# Patient Record
Sex: MALE | Race: OTHER | HISPANIC OR LATINO | URBAN - METROPOLITAN AREA
[De-identification: names, ages, dates, MRNs, and addresses within clinical notes are randomized per-mention and may not be internally consistent; named-entity substitution may affect disease eponyms.]

---

## 2019-12-23 ENCOUNTER — INPATIENT (INPATIENT)
Facility: HOSPITAL | Age: 84
LOS: 3 days | Discharge: HOME HEALTH | End: 2019-12-27
Attending: INTERNAL MEDICINE | Admitting: INTERNAL MEDICINE
Payer: MEDICAID

## 2019-12-23 VITALS
TEMPERATURE: 99 F | OXYGEN SATURATION: 99 % | SYSTOLIC BLOOD PRESSURE: 124 MMHG | HEART RATE: 87 BPM | RESPIRATION RATE: 22 BRPM | DIASTOLIC BLOOD PRESSURE: 77 MMHG

## 2019-12-23 DIAGNOSIS — Z02.9 ENCOUNTER FOR ADMINISTRATIVE EXAMINATIONS, UNSPECIFIED: ICD-10-CM

## 2019-12-23 DIAGNOSIS — R74.0 NONSPECIFIC ELEVATION OF LEVELS OF TRANSAMINASE AND LACTIC ACID DEHYDROGENASE [LDH]: ICD-10-CM

## 2019-12-23 DIAGNOSIS — A41.9 SEPSIS, UNSPECIFIED ORGANISM: ICD-10-CM

## 2019-12-23 DIAGNOSIS — E87.1 HYPO-OSMOLALITY AND HYPONATREMIA: ICD-10-CM

## 2019-12-23 DIAGNOSIS — L02.91 CUTANEOUS ABSCESS, UNSPECIFIED: ICD-10-CM

## 2019-12-23 DIAGNOSIS — Z29.9 ENCOUNTER FOR PROPHYLACTIC MEASURES, UNSPECIFIED: ICD-10-CM

## 2019-12-23 DIAGNOSIS — D64.9 ANEMIA, UNSPECIFIED: ICD-10-CM

## 2019-12-23 DIAGNOSIS — E87.8 OTHER DISORDERS OF ELECTROLYTE AND FLUID BALANCE, NOT ELSEWHERE CLASSIFIED: ICD-10-CM

## 2019-12-23 DIAGNOSIS — R50.9 FEVER, UNSPECIFIED: ICD-10-CM

## 2019-12-23 LAB
ALBUMIN SERPL ELPH-MCNC: 3.6 G/DL — SIGNIFICANT CHANGE UP (ref 3.3–5)
ALP SERPL-CCNC: 127 U/L — HIGH (ref 40–120)
ALT FLD-CCNC: 28 U/L — SIGNIFICANT CHANGE UP (ref 4–41)
ANION GAP SERPL CALC-SCNC: 10 MMO/L — SIGNIFICANT CHANGE UP (ref 7–14)
AST SERPL-CCNC: 51 U/L — HIGH (ref 4–40)
BASE EXCESS BLDV CALC-SCNC: 0.4 MMOL/L — SIGNIFICANT CHANGE UP
BASOPHILS # BLD AUTO: 0.02 K/UL — SIGNIFICANT CHANGE UP (ref 0–0.2)
BASOPHILS NFR BLD AUTO: 0.2 % — SIGNIFICANT CHANGE UP (ref 0–2)
BILIRUB SERPL-MCNC: 2.6 MG/DL — HIGH (ref 0.2–1.2)
BLOOD GAS VENOUS - CREATININE: 0.75 MG/DL — SIGNIFICANT CHANGE UP (ref 0.5–1.3)
BLOOD GAS VENOUS - FIO2: 21 — SIGNIFICANT CHANGE UP
BUN SERPL-MCNC: 15 MG/DL — SIGNIFICANT CHANGE UP (ref 7–23)
CALCIUM SERPL-MCNC: 8.5 MG/DL — SIGNIFICANT CHANGE UP (ref 8.4–10.5)
CHLORIDE BLDV-SCNC: 95 MMOL/L — LOW (ref 96–108)
CHLORIDE SERPL-SCNC: 93 MMOL/L — LOW (ref 98–107)
CO2 SERPL-SCNC: 22 MMOL/L — SIGNIFICANT CHANGE UP (ref 22–31)
CREAT SERPL-MCNC: 0.77 MG/DL — SIGNIFICANT CHANGE UP (ref 0.5–1.3)
EOSINOPHIL # BLD AUTO: 0.28 K/UL — SIGNIFICANT CHANGE UP (ref 0–0.5)
EOSINOPHIL NFR BLD AUTO: 2.4 % — SIGNIFICANT CHANGE UP (ref 0–6)
GAS PNL BLDV: 126 MMOL/L — LOW (ref 136–146)
GLUCOSE BLDV-MCNC: 133 MG/DL — HIGH (ref 70–99)
GLUCOSE SERPL-MCNC: 134 MG/DL — HIGH (ref 70–99)
HBA1C BLD-MCNC: 5.3 % — SIGNIFICANT CHANGE UP (ref 4–5.6)
HCO3 BLDV-SCNC: 24 MMOL/L — SIGNIFICANT CHANGE UP (ref 20–27)
HCT VFR BLD CALC: 36.8 % — LOW (ref 39–50)
HCT VFR BLDV CALC: 38.7 % — LOW (ref 39–51)
HGB BLD-MCNC: 12.3 G/DL — LOW (ref 13–17)
HGB BLDV-MCNC: 12.6 G/DL — LOW (ref 13–17)
IMM GRANULOCYTES NFR BLD AUTO: 0.4 % — SIGNIFICANT CHANGE UP (ref 0–1.5)
LACTATE BLDV-MCNC: 2.3 MMOL/L — HIGH (ref 0.5–2)
LYMPHOCYTES # BLD AUTO: 0.48 K/UL — LOW (ref 1–3.3)
LYMPHOCYTES # BLD AUTO: 4.1 % — LOW (ref 13–44)
MAGNESIUM SERPL-MCNC: 1.9 MG/DL — SIGNIFICANT CHANGE UP (ref 1.6–2.6)
MCHC RBC-ENTMCNC: 32.1 PG — SIGNIFICANT CHANGE UP (ref 27–34)
MCHC RBC-ENTMCNC: 33.4 % — SIGNIFICANT CHANGE UP (ref 32–36)
MCV RBC AUTO: 96.1 FL — SIGNIFICANT CHANGE UP (ref 80–100)
MONOCYTES # BLD AUTO: 0.81 K/UL — SIGNIFICANT CHANGE UP (ref 0–0.9)
MONOCYTES NFR BLD AUTO: 6.9 % — SIGNIFICANT CHANGE UP (ref 2–14)
NEUTROPHILS # BLD AUTO: 10.06 K/UL — HIGH (ref 1.8–7.4)
NEUTROPHILS NFR BLD AUTO: 86 % — HIGH (ref 43–77)
NRBC # FLD: 0 K/UL — SIGNIFICANT CHANGE UP (ref 0–0)
PCO2 BLDV: 41 MMHG — SIGNIFICANT CHANGE UP (ref 41–51)
PH BLDV: 7.4 PH — SIGNIFICANT CHANGE UP (ref 7.32–7.43)
PHOSPHATE SERPL-MCNC: 1.7 MG/DL — LOW (ref 2.5–4.5)
PLATELET # BLD AUTO: 158 K/UL — SIGNIFICANT CHANGE UP (ref 150–400)
PMV BLD: 10.1 FL — SIGNIFICANT CHANGE UP (ref 7–13)
PO2 BLDV: 40 MMHG — SIGNIFICANT CHANGE UP (ref 35–40)
POTASSIUM BLDV-SCNC: 4 MMOL/L — SIGNIFICANT CHANGE UP (ref 3.4–4.5)
POTASSIUM SERPL-MCNC: 4.3 MMOL/L — SIGNIFICANT CHANGE UP (ref 3.5–5.3)
POTASSIUM SERPL-SCNC: 4.3 MMOL/L — SIGNIFICANT CHANGE UP (ref 3.5–5.3)
PROT SERPL-MCNC: 7.1 G/DL — SIGNIFICANT CHANGE UP (ref 6–8.3)
RBC # BLD: 3.83 M/UL — LOW (ref 4.2–5.8)
RBC # FLD: 13.1 % — SIGNIFICANT CHANGE UP (ref 10.3–14.5)
SAO2 % BLDV: 74.7 % — SIGNIFICANT CHANGE UP (ref 60–85)
SODIUM SERPL-SCNC: 125 MMOL/L — LOW (ref 135–145)
WBC # BLD: 11.7 K/UL — HIGH (ref 3.8–10.5)
WBC # FLD AUTO: 11.7 K/UL — HIGH (ref 3.8–10.5)

## 2019-12-23 PROCEDURE — 99223 1ST HOSP IP/OBS HIGH 75: CPT | Mod: GC

## 2019-12-23 RX ORDER — SODIUM CHLORIDE 9 MG/ML
500 INJECTION INTRAMUSCULAR; INTRAVENOUS; SUBCUTANEOUS ONCE
Refills: 0 | Status: COMPLETED | OUTPATIENT
Start: 2019-12-23 | End: 2019-12-23

## 2019-12-23 RX ORDER — AMPICILLIN SODIUM AND SULBACTAM SODIUM 250; 125 MG/ML; MG/ML
1.5 INJECTION, POWDER, FOR SUSPENSION INTRAMUSCULAR; INTRAVENOUS EVERY 6 HOURS
Refills: 0 | Status: DISCONTINUED | OUTPATIENT
Start: 2019-12-23 | End: 2019-12-27

## 2019-12-23 RX ORDER — ENOXAPARIN SODIUM 100 MG/ML
40 INJECTION SUBCUTANEOUS DAILY
Refills: 0 | Status: DISCONTINUED | OUTPATIENT
Start: 2019-12-23 | End: 2019-12-27

## 2019-12-23 RX ORDER — ACETAMINOPHEN 500 MG
650 TABLET ORAL EVERY 6 HOURS
Refills: 0 | Status: DISCONTINUED | OUTPATIENT
Start: 2019-12-23 | End: 2019-12-27

## 2019-12-23 RX ORDER — VANCOMYCIN HCL 1 G
VIAL (EA) INTRAVENOUS
Refills: 0 | Status: DISCONTINUED | OUTPATIENT
Start: 2019-12-24 | End: 2019-12-25

## 2019-12-23 RX ORDER — POTASSIUM PHOSPHATE, MONOBASIC POTASSIUM PHOSPHATE, DIBASIC 236; 224 MG/ML; MG/ML
15 INJECTION, SOLUTION INTRAVENOUS ONCE
Refills: 0 | Status: COMPLETED | OUTPATIENT
Start: 2019-12-23 | End: 2019-12-23

## 2019-12-23 RX ORDER — SODIUM,POTASSIUM PHOSPHATES 278-250MG
1 POWDER IN PACKET (EA) ORAL
Refills: 0 | Status: COMPLETED | OUTPATIENT
Start: 2019-12-23 | End: 2019-12-24

## 2019-12-23 RX ADMIN — Medication 1 PACKET(S): at 22:02

## 2019-12-23 RX ADMIN — SODIUM CHLORIDE 500 MILLILITER(S): 9 INJECTION INTRAMUSCULAR; INTRAVENOUS; SUBCUTANEOUS at 20:39

## 2019-12-23 RX ADMIN — POTASSIUM PHOSPHATE, MONOBASIC POTASSIUM PHOSPHATE, DIBASIC 62.5 MILLIMOLE(S): 236; 224 INJECTION, SOLUTION INTRAVENOUS at 22:02

## 2019-12-23 RX ADMIN — Medication 1 TABLET(S): at 17:59

## 2019-12-23 NOTE — H&P ADULT - NSHPLABSRESULTS_GEN_ALL_CORE
CBC Full  -  ( 23 Dec 2019 11:00 )  WBC Count : 11.70 K/uL  Hemoglobin : 12.3 g/dL  Hematocrit : 36.8 %  Platelet Count - Automated : 158 K/uL  Mean Cell Volume : 96.1 fL  Mean Cell Hemoglobin : 32.1 pg  Mean Cell Hemoglobin Concentration : 33.4 %  Auto Neutrophil # : 10.06 K/uL  Auto Lymphocyte # : 0.48 K/uL  Auto Monocyte # : 0.81 K/uL  Auto Eosinophil # : 0.28 K/uL  Auto Basophil # : 0.02 K/uL  Auto Neutrophil % : 86.0 %  Auto Lymphocyte % : 4.1 %  Auto Monocyte % : 6.9 %  Auto Eosinophil % : 2.4 %  Auto Basophil % : 0.2 %    12-23    125<L>  |  93<L>  |  15  ----------------------------<  134<H>  4.3   |  22  |  0.77    Ca    8.5      23 Dec 2019 15:34  Phos  1.7     12-23  Mg     1.9     12-23    TPro  7.1  /  Alb  3.6  /  TBili  2.6<H>  /  DBili  x   /  AST  51<H>  /  ALT  28  /  AlkPhos  127<H>  12-23    LIVER FUNCTIONS - ( 23 Dec 2019 15:34 )  Alb: 3.6 g/dL / Pro: 7.1 g/dL / ALK PHOS: 127 u/L / ALT: 28 u/L / AST: 51 u/L / GGT: x

## 2019-12-23 NOTE — ED PROVIDER NOTE - PROGRESS NOTE DETAILS
CIANO- abscess drained at bedside by PA but on labs found to be hyponatremic. will require admission

## 2019-12-23 NOTE — H&P ADULT - PROBLEM SELECTOR PLAN 4
- patient with elevated T.bili, mild transaminitis  - possibly 2/2 to sepsis  - obtain abdominal ultrasound  - continue to trend - patient with elevated T.bili, mild transaminitis improved s/p fluids  - possibly 2/2 to sepsis  - continue to trend

## 2019-12-23 NOTE — H&P ADULT - ATTENDING COMMENTS
Patient seen and examined at bedside. Neck abscess s/p I&D per ED. BCx sent. Will c/w abx. Will perform CT neck w/ contrast. Monitor CBC. Also presented with hyponatremia s/p IV bolus. Will repeat BMP at midnight and BMP q6h. Urine lytes sent to determine etiology.

## 2019-12-23 NOTE — ED PROCEDURE NOTE - ATTENDING CONTRIBUTION TO CARE
elderly male with neck abscess drained at bedside by PA elderly male with left lateral neck abscess with superficial depth drained at bedside by PA

## 2019-12-23 NOTE — ED ADULT NURSE NOTE - OBJECTIVE STATEMENT
89 y/o M received to room 23 c/o abscess. Pt a&ox2 and ambulatory (not oriented to date) at baseline. Pt is [place]. Pt primarily Citizen of [place] speaking and requested in english for family members to translate. Pt unsure how long he has had the abscess located on L posterior side of neck. Pt states he has no pain. Abscess warm to touch and nontender with scab observed.  Pt has full ROM of neck and no pain with movement.  Pt respirations even and unlabored with lung sounds clear bilaterally. Pt abdomen soft non-tender nondistended. Pt denies fevers chills, CP, SOB, HA, dizziness, lightheadedness or n/v/d. Vital signs as noted, call bell in reach, comfort measures provided, md evaluated, 20G IV placed L AC, labs drawn and sent.  Will continue to monitor.

## 2019-12-23 NOTE — ED PROVIDER NOTE - MUSCULOSKELETAL, MLM
No visible deformities, edema, erythema,  range of motion is not limited, no muscle or joint tenderness

## 2019-12-23 NOTE — H&P ADULT - NSHPREVIEWOFSYSTEMS_GEN_ALL_CORE
REVIEW OF SYSTEMS:    CONSTITUTIONAL: No weakness, fevers or chills  EYES/ENT: No visual changes;  No vertigo or throat pain   NECK: see HPI  RESPIRATORY: No cough, wheezing, hemoptysis; No shortness of breath  CARDIOVASCULAR: No chest pain or palpitations  GASTROINTESTINAL: No abdominal pain; nausea, vomiting;  diarrhea or constipation. No hemetemesis, melena or hematochezia.  GENITOURINARY: No dysuria, frequency or hematuria  NEUROLOGICAL: No numbness or weakness  SKIN: No itching, burning, rashes, or lesions   MUSCULOSKEL: no pain  All other review of systems is negative unless indicated above.

## 2019-12-23 NOTE — H&P ADULT - ASSESSMENT
89 M with no significant past medical history presents with abscess on the L side of his neck, s/p drainage, now on abx. Patient also found to be hyponatremia.

## 2019-12-23 NOTE — ED ADULT NURSE NOTE - NSIMPLEMENTINTERV_GEN_ALL_ED
Implemented All Fall with Harm Risk Interventions:  [place] to call system. Call bell, personal items and telephone within reach. Instruct patient to call for assistance. Room bathroom lighting operational. Non-slip footwear when patient is off stretcher. Physically safe environment: no spills, clutter or unnecessary equipment. Stretcher in lowest position, wheels locked, appropriate side rails in place. Provide visual cue, wrist band, yellow gown, etc. Monitor gait and stability. Monitor for mental status changes and reorient to person, place, and time. Review medications for side effects contributing to fall risk. Reinforce activity limits and safety measures with patient and family. Provide visual clues: red socks.

## 2019-12-23 NOTE — H&P ADULT - PROBLEM SELECTOR PLAN 2
- patient septic on admission (febrile, tachypneic 2/2 to neck abscess)  - f/u cultures of neck abscess drainage  - f/u blood cultures  - elevated lactate 2.3, will repeat s/p fluids

## 2019-12-23 NOTE — H&P ADULT - HISTORY OF PRESENT ILLNESS
89 M with no significant past medical history presents with abscess on the L side of his neck. Per son, he thinks neck mass has been there for about 3 days only. He had a small pimple on his neck that he popped and since then has been having some pain and discharge. No difficulty moving his neck, no difficulty swallowing. Patient denies any trauma, bug bites, fevers, chills, rashes. No nausea, vomiting, chest pain, shortness of breath. No dysuria, hematuria, or diarrhea. Patient denies any runny nose, sore throat cough.     In the ED, T 100.7, HR 87, /77, RR 22 satting 99% room air. Patient had I&D of neck mass and received Bactrim. 89 M with no significant past medical history presents with abscess on the L side of his neck. Per son, he thinks neck mass has been there for about 3 days only. He had a small pimple on his neck that he popped and since then has been having some pain and discharge. Neck is warm and erythematous. No difficulty moving his neck, no difficulty swallowing. Patient denies any trauma, bug bites, fevers, chills, rashes. No nausea, vomiting, chest pain, shortness of breath. No dysuria, hematuria, or diarrhea. Patient denies any runny nose, sore throat cough.     In the ED, T 100.7, HR 87, /77, RR 22 satting 99% room air. Patient had I&D of neck mass and received Bactrim.

## 2019-12-23 NOTE — ED PROVIDER NOTE - ATTENDING CONTRIBUTION TO CARE
ashley: limited hx from pt and family at bedside.  visiting from [place] since last week. today family noted swelling to left neck and came to ED. pt offers not complaints. Denies any PMH.  exam: there is a large abscess posterior left neck with pus expressable.   exam otherwise unremarkable.  recc: labs incl hba1c. rectal temp. Drain asbcess; sent for culture and start antibitoics. Disposition dependent on labs and findings after I and D. ashley: limited hx from pt and family at bedside.  visiting from [place] since last week. today family noted swelling to left neck and came to ED. pt offers not complaints. Denies any PMH.  exam: there is a large abscess posterior left neck with pus expressable.   exam otherwise unremarkable.  recc: labs incl hba1c. rectal temp. Drain asbcess; sent for culture and start antibitoics. Disposition dependent on labs and findings after I and D.. ashley: limited hx from pt and family at bedside.  visiting from [place] since last week. today family noted swelling to left neck and came to ED. pt offers not complaints. Denies any PMH.  exam: there is a large abscess posterior left neck with pus expressible.   exam otherwise unremarkable.  recc: labs incl hba1c. rectal temp. Drain abscess; sent for culture and start antibiotics. Disposition dependent on labs and findings after I and D..

## 2019-12-23 NOTE — H&P ADULT - NSHPPHYSICALEXAM_GEN_ALL_CORE
PHYSICAL EXAM:    Vital Signs Last 24 Hrs  T(C): 38.2 (23 Dec 2019 15:53), Max: 38.2 (23 Dec 2019 15:53)  T(F): 100.7 (23 Dec 2019 15:53), Max: 100.7 (23 Dec 2019 15:53)  HR: 71 (23 Dec 2019 15:53) (71 - 87)  BP: 109/64 (23 Dec 2019 15:53) (109/64 - 124/77)  BP(mean): --  RR: 16 (23 Dec 2019 15:53) (16 - 22)  SpO2: 99% (23 Dec 2019 15:53) (99% - 99%)    General: No acute distress.  HEENT: NCAT.  PERRL.  EOMI.  No scleral icterus or injection.  Moist MM.  No oropharyngeal exudates. Poor dentition  Neck: Supple.  Full ROM.  No JVD.  + neck abscess on L posterior neck with small incision at the center, tender to palpation  Heart: Irregular rate. Normal S1 and S2.  No murmurs, rubs, or gallops.   Lungs: CTAB. No wheezes, crackles, or rhonchi.    Abdomen: BS+, soft, NT/ND.   Skin: induration of L posterior neck with pus draining as well as blood   Extremities: No edema, clubbing, or cyanosis.    Musculoskeletal: No deformities.  No spinal or paraspinal tenderness.  Neuro: A&Ox3.  Moving all extremities, no neuro focal deficits

## 2019-12-23 NOTE — ED PROVIDER NOTE - SKIN COLOR
normal for race/area of fluctuance and erythema with purulent drainage the size of a quarter on the R posterior neck. normal for race/area of fluctuance and erythema with purulent drainage the size of a quarter on the L lateral neck.

## 2019-12-23 NOTE — H&P ADULT - PROBLEM SELECTOR PLAN 3
- patient hyponatremic to 125 on admission  - f/u serum osmolality, urine lytes  - give 500 cc NS bolus  - repeat BMP  - continue to trend - patient hyponatremic to 125 on admission, s/p 500cc bolus with no improvement in sodium level  - low serum osmolality, urine lytes consistent with SIADH  - f/u TSH, am cortisol  - repeat BMP  - continue to trend

## 2019-12-23 NOTE — ED ADULT TRIAGE NOTE - CHIEF COMPLAINT QUOTE
pt. brought by granddaughter for an abscess on the L side of his neck, redness noted around the abscess, unsure of how long it has been there. Family denies PMHx.

## 2019-12-23 NOTE — ED PROVIDER NOTE - OBJECTIVE STATEMENT
89yo male with no sig pmh, as per granddaughter, presents complaining of an abscess on the right side of his neck. Patient is poor historian unable to answer questions appropriately. Granddaughter at bedside reports she does not know how long he has had the abscess because she just saw him for the first time today since he arrived from [place] 2 months ago. She says that patient told her that he felt a small pimple on his neck that he popped 3 days ago and reports some pain but denies antecedent trauma to the area, bug bites, fevers/chills, rashes, pain with rom of neck, nausea/vomiting, chest pain, sob, and other associated symptoms. 89yo male with no sig pmh, as per granddaughter, presents complaining of an abscess on the left side of his neck. Patient is poor historian unable to answer questions appropriately. Granddaughter at bedside reports she does not know how long he has had the abscess because she just saw him for the first time today since he arrived from [place] 2 months ago. She says that patient told her that he felt a small pimple on his neck that he popped 3 days ago and reports some pain but denies antecedent trauma to the area, bug bites, fevers/chills, rashes, pain with rom of neck, nausea/vomiting, chest pain, sob, and other associated symptoms.

## 2019-12-23 NOTE — H&P ADULT - NSHPSOCIALHISTORY_GEN_ALL_CORE
Patient lives with daughter in [place], came to visit son in [place]. No smoking history, occasional alcohol history. Patient is ambulatory at baseline with no assistance.

## 2019-12-23 NOTE — H&P ADULT - PROBLEM SELECTOR PLAN 1
- patient with L sided neck abscess s/p drainage  - f/u cultures  - continue Bactrim   - If no improvement, can consider imaging of neck mass - patient with L sided neck abscess s/p drainage  - f/u cultures of wound, continue Bactrim for now  - f/u CT neck to assess extent of abscess - patient with L sided neck abscess s/p drainage, s/p Bactrim  - f/u cultures of wound  - f/u CT neck to assess extent of abscess  - continue abx, broaden to vanc, unasyn

## 2019-12-24 LAB
ALBUMIN SERPL ELPH-MCNC: 3 G/DL — LOW (ref 3.3–5)
ALBUMIN SERPL ELPH-MCNC: 3.2 G/DL — LOW (ref 3.3–5)
ALP SERPL-CCNC: 109 U/L — SIGNIFICANT CHANGE UP (ref 40–120)
ALP SERPL-CCNC: 110 U/L — SIGNIFICANT CHANGE UP (ref 40–120)
ALT FLD-CCNC: 24 U/L — SIGNIFICANT CHANGE UP (ref 4–41)
ALT FLD-CCNC: 24 U/L — SIGNIFICANT CHANGE UP (ref 4–41)
ANION GAP SERPL CALC-SCNC: 10 MMO/L — SIGNIFICANT CHANGE UP (ref 7–14)
ANION GAP SERPL CALC-SCNC: 12 MMO/L — SIGNIFICANT CHANGE UP (ref 7–14)
AST SERPL-CCNC: 33 U/L — SIGNIFICANT CHANGE UP (ref 4–40)
AST SERPL-CCNC: 33 U/L — SIGNIFICANT CHANGE UP (ref 4–40)
BASOPHILS # BLD AUTO: 0.03 K/UL — SIGNIFICANT CHANGE UP (ref 0–0.2)
BASOPHILS NFR BLD AUTO: 0.3 % — SIGNIFICANT CHANGE UP (ref 0–2)
BILIRUB SERPL-MCNC: 2.1 MG/DL — HIGH (ref 0.2–1.2)
BILIRUB SERPL-MCNC: 2.5 MG/DL — HIGH (ref 0.2–1.2)
BUN SERPL-MCNC: 11 MG/DL — SIGNIFICANT CHANGE UP (ref 7–23)
BUN SERPL-MCNC: 12 MG/DL — SIGNIFICANT CHANGE UP (ref 7–23)
CALCIUM SERPL-MCNC: 7.9 MG/DL — LOW (ref 8.4–10.5)
CALCIUM SERPL-MCNC: 8.2 MG/DL — LOW (ref 8.4–10.5)
CHLORIDE SERPL-SCNC: 93 MMOL/L — LOW (ref 98–107)
CHLORIDE SERPL-SCNC: 95 MMOL/L — LOW (ref 98–107)
CHLORIDE UR-SCNC: 84 MMOL/L — SIGNIFICANT CHANGE UP
CO2 SERPL-SCNC: 20 MMOL/L — LOW (ref 22–31)
CO2 SERPL-SCNC: 21 MMOL/L — LOW (ref 22–31)
CREAT SERPL-MCNC: 0.7 MG/DL — SIGNIFICANT CHANGE UP (ref 0.5–1.3)
CREAT SERPL-MCNC: 0.71 MG/DL — SIGNIFICANT CHANGE UP (ref 0.5–1.3)
EOSINOPHIL # BLD AUTO: 0.49 K/UL — SIGNIFICANT CHANGE UP (ref 0–0.5)
EOSINOPHIL NFR BLD AUTO: 4.5 % — SIGNIFICANT CHANGE UP (ref 0–6)
FERRITIN SERPL-MCNC: 387.4 NG/ML — SIGNIFICANT CHANGE UP (ref 30–400)
GLUCOSE SERPL-MCNC: 108 MG/DL — HIGH (ref 70–99)
GLUCOSE SERPL-MCNC: 125 MG/DL — HIGH (ref 70–99)
GRAM STN SPEC: SIGNIFICANT CHANGE UP
HCT VFR BLD CALC: 35 % — LOW (ref 39–50)
HGB BLD-MCNC: 11.7 G/DL — LOW (ref 13–17)
IMM GRANULOCYTES NFR BLD AUTO: 0.5 % — SIGNIFICANT CHANGE UP (ref 0–1.5)
IRON SATN MFR SERPL: 17 UG/DL — LOW (ref 45–165)
IRON SATN MFR SERPL: 178 UG/DL — SIGNIFICANT CHANGE UP (ref 155–535)
LYMPHOCYTES # BLD AUTO: 0.51 K/UL — LOW (ref 1–3.3)
LYMPHOCYTES # BLD AUTO: 4.6 % — LOW (ref 13–44)
MAGNESIUM SERPL-MCNC: 1.8 MG/DL — SIGNIFICANT CHANGE UP (ref 1.6–2.6)
MAGNESIUM SERPL-MCNC: 1.8 MG/DL — SIGNIFICANT CHANGE UP (ref 1.6–2.6)
MCHC RBC-ENTMCNC: 31.9 PG — SIGNIFICANT CHANGE UP (ref 27–34)
MCHC RBC-ENTMCNC: 33.4 % — SIGNIFICANT CHANGE UP (ref 32–36)
MCV RBC AUTO: 95.4 FL — SIGNIFICANT CHANGE UP (ref 80–100)
MONOCYTES # BLD AUTO: 0.81 K/UL — SIGNIFICANT CHANGE UP (ref 0–0.9)
MONOCYTES NFR BLD AUTO: 7.4 % — SIGNIFICANT CHANGE UP (ref 2–14)
NEUTROPHILS # BLD AUTO: 9.1 K/UL — HIGH (ref 1.8–7.4)
NEUTROPHILS NFR BLD AUTO: 82.7 % — HIGH (ref 43–77)
NRBC # FLD: 0 K/UL — SIGNIFICANT CHANGE UP (ref 0–0)
OSMOLALITY UR: 690 MOSMO/KG — SIGNIFICANT CHANGE UP (ref 50–1200)
PHOSPHATE SERPL-MCNC: 2.4 MG/DL — LOW (ref 2.5–4.5)
PHOSPHATE SERPL-MCNC: 2.6 MG/DL — SIGNIFICANT CHANGE UP (ref 2.5–4.5)
PLATELET # BLD AUTO: 159 K/UL — SIGNIFICANT CHANGE UP (ref 150–400)
PMV BLD: 10.1 FL — SIGNIFICANT CHANGE UP (ref 7–13)
POTASSIUM SERPL-MCNC: 3.6 MMOL/L — SIGNIFICANT CHANGE UP (ref 3.5–5.3)
POTASSIUM SERPL-MCNC: 4.1 MMOL/L — SIGNIFICANT CHANGE UP (ref 3.5–5.3)
POTASSIUM SERPL-SCNC: 3.6 MMOL/L — SIGNIFICANT CHANGE UP (ref 3.5–5.3)
POTASSIUM SERPL-SCNC: 4.1 MMOL/L — SIGNIFICANT CHANGE UP (ref 3.5–5.3)
POTASSIUM UR-SCNC: 55.2 MMOL/L — SIGNIFICANT CHANGE UP
PROT SERPL-MCNC: 5.9 G/DL — LOW (ref 6–8.3)
PROT SERPL-MCNC: 6.2 G/DL — SIGNIFICANT CHANGE UP (ref 6–8.3)
RBC # BLD: 3.67 M/UL — LOW (ref 4.2–5.8)
RBC # FLD: 13 % — SIGNIFICANT CHANGE UP (ref 10.3–14.5)
SODIUM SERPL-SCNC: 124 MMOL/L — LOW (ref 135–145)
SODIUM SERPL-SCNC: 127 MMOL/L — LOW (ref 135–145)
SODIUM UR-SCNC: 113 MMOL/L — SIGNIFICANT CHANGE UP
SPECIMEN SOURCE: SIGNIFICANT CHANGE UP
TSH SERPL-MCNC: 2.36 UIU/ML — SIGNIFICANT CHANGE UP (ref 0.27–4.2)
UIBC SERPL-MCNC: 161.1 UG/DL — SIGNIFICANT CHANGE UP (ref 110–370)
WBC # BLD: 11 K/UL — HIGH (ref 3.8–10.5)
WBC # FLD AUTO: 11 K/UL — HIGH (ref 3.8–10.5)

## 2019-12-24 PROCEDURE — 99233 SBSQ HOSP IP/OBS HIGH 50: CPT | Mod: GC

## 2019-12-24 PROCEDURE — 70491 CT SOFT TISSUE NECK W/DYE: CPT | Mod: 26

## 2019-12-24 RX ORDER — VANCOMYCIN HCL 1 G
1000 VIAL (EA) INTRAVENOUS ONCE
Refills: 0 | Status: COMPLETED | OUTPATIENT
Start: 2019-12-24 | End: 2019-12-24

## 2019-12-24 RX ORDER — VANCOMYCIN HCL 1 G
1000 VIAL (EA) INTRAVENOUS EVERY 12 HOURS
Refills: 0 | Status: DISCONTINUED | OUTPATIENT
Start: 2019-12-24 | End: 2019-12-25

## 2019-12-24 RX ADMIN — Medication 250 MILLIGRAM(S): at 17:58

## 2019-12-24 RX ADMIN — ENOXAPARIN SODIUM 40 MILLIGRAM(S): 100 INJECTION SUBCUTANEOUS at 13:30

## 2019-12-24 RX ADMIN — AMPICILLIN SODIUM AND SULBACTAM SODIUM 100 GRAM(S): 250; 125 INJECTION, POWDER, FOR SUSPENSION INTRAMUSCULAR; INTRAVENOUS at 01:19

## 2019-12-24 RX ADMIN — AMPICILLIN SODIUM AND SULBACTAM SODIUM 100 GRAM(S): 250; 125 INJECTION, POWDER, FOR SUSPENSION INTRAMUSCULAR; INTRAVENOUS at 13:30

## 2019-12-24 RX ADMIN — Medication 1 PACKET(S): at 17:58

## 2019-12-24 RX ADMIN — Medication 1 PACKET(S): at 13:30

## 2019-12-24 RX ADMIN — Medication 1 PACKET(S): at 07:01

## 2019-12-24 RX ADMIN — Medication 250 MILLIGRAM(S): at 01:14

## 2019-12-24 RX ADMIN — AMPICILLIN SODIUM AND SULBACTAM SODIUM 100 GRAM(S): 250; 125 INJECTION, POWDER, FOR SUSPENSION INTRAMUSCULAR; INTRAVENOUS at 18:00

## 2019-12-24 RX ADMIN — AMPICILLIN SODIUM AND SULBACTAM SODIUM 100 GRAM(S): 250; 125 INJECTION, POWDER, FOR SUSPENSION INTRAMUSCULAR; INTRAVENOUS at 07:01

## 2019-12-24 NOTE — PROGRESS NOTE ADULT - PROBLEM SELECTOR PLAN 2
- patient septic on admission (febrile, tachypneic 2/2 to neck abscess)  - f/u cultures of neck abscess drainage  - f/u blood cultures  - elevated lactate 2.3, will repeat s/p fluids - patient septic on admission (febrile, tachypneic 2/2 to neck abscess)  - f/u cultures of neck abscess drainage  - f/u blood cultures  - elevated lactate 2.3, will repeat s/p fluids  - f/u HIV test

## 2019-12-24 NOTE — PROGRESS NOTE ADULT - PROBLEM SELECTOR PLAN 4
- patient with elevated T.bili, mild transaminitis improved s/p fluids  - possibly 2/2 to sepsis  - continue to trend

## 2019-12-24 NOTE — PROVIDER CONTACT NOTE (OTHER) - BACKGROUND
pt admitted for fever d/t unspecified condition with abscess on L side of neck. PMH of anemia, electrolyte abnormalities and hyponatremia.

## 2019-12-24 NOTE — PROGRESS NOTE ADULT - PROBLEM SELECTOR PLAN 3
- patient hyponatremic to 125 on admission, s/p 500cc bolus with no improvement in sodium level  - low serum osmolality, urine lytes consistent with SIADH  - TSH wnl  - f/u AM cortisol - patient hyponatremic to 125 on admission, s/p 500cc bolus with no improvement in sodium level  - low serum osmolality, urine lytes consistent with SIADH  - TSH wnl  - f/u AM cortisol  - Fluid restriction 800cc

## 2019-12-24 NOTE — PROGRESS NOTE ADULT - ASSESSMENT
89 M with no significant past medical history presents with abscess on the L side of his neck, s/p drainage, now on vancomycin and unasyn.     Patient also found to be hyponatremia.

## 2019-12-24 NOTE — PROGRESS NOTE ADULT - PROBLEM SELECTOR PLAN 1
- patient with L sided neck abscess s/p drainage, s/p Bactrim  - f/u cultures of wound  - CT Neck: Abscess collection within the superficial soft tissues of the left lateral neck with associated cellulitis.  - Continue vanc/unasyn (12/24  - )  - Will consult plastics for further management of abscess and need for additional drainage.

## 2019-12-24 NOTE — PROGRESS NOTE ADULT - PROBLEM SELECTOR PLAN 6
- patient with normocytic anemia with Hgb 12.3 on admission, unknown baseline  - iron studies c/w SYDNI  - no signs of active bleed at this time  - continue to trend

## 2019-12-24 NOTE — CONSULT NOTE ADULT - SUBJECTIVE AND OBJECTIVE BOX
Reason for Consultation: Left posterior neck infection  Requested by: medicine    HPI:  89 M with no significant past medical history presented to ED 12/23 with left posterior neck infection. Incision and draniage by ED staff. Admitted for antibiotics and hyponatremia. Initially treated with bactrim currently on vancomycin, Unasyn.    ORL consulted for further management of left neck infection/abscess    Vital Signs Last 24 Hrs  T(C): 36.6 (24 Dec 2019 13:33), Max: 38.2 (23 Dec 2019 15:53)  T(F): 97.8 (24 Dec 2019 13:33), Max: 100.7 (23 Dec 2019 15:53)  HR: 75 (24 Dec 2019 13:33) (71 - 110)  BP: 105/77 (24 Dec 2019 13:33) (104/52 - 132/75)  BP(mean): --  RR: 18 (24 Dec 2019 13:33) (16 - 18)  SpO2: 99% (24 Dec 2019 13:33) (98% - 99%)    PHYSICAL EXAM:    Imaging    A/P    WBC 11.7 to 11    CT neck with contrast 12/24  FINDINGS:    There is a peripherally enhancing low-density collection within the subcutaneous tissues of the left lateral neck along the posterior margin of the sternocleidomastoid superficial to the trapezius there is associated skin thickening and extensive haziness of adjacent fat planes. The collection measures 4.2 x 2.5 0.8 cm there is soft tissue swelling of the underlying musculature compatible with associated myositis.    AERODIGESTIVE TRACT:  Normal.    LYMPH NODES:  No adenopathy.    PAROTID GLANDS:  Normal.    SUBMANDIBULAR GLANDS:  Normal.    THYROID GLAND:  Normal.    VISUALIZED PARANASAL SINUSES:  Mild to moderate mucosal thickening    VISUALIZED TYMPANOMASTOID CAVITIES: Underpneumatized on the left.    BONES:  Normal.    MISCELLANEOUS:  Small right pleural effusion      IMPRESSION:    Abscess collection within the superficial soft tissues of the left lateral neck with associated cellulitis.    A/P  89 M with no significant past medical history presented to ED 12/23 with left posterior neck infection. Incision and draniage by ED staff. Admitted for antibiotics and hyponatremia. Initially treated with bactrim currently on vancomycin, Unasyn.      Left posterior neck infection, abscess       NOT FINAL RECS  -s/p  -continue antibitoics  -abx per primary team  -warm compress to left posterior neck   -will follow  -call/page with questions  -will discuss with attending and update team with any further recommendations Reason for Consultation: Left posterior neck infection  Requested by: medicine    HPI:  89 M with no significant past medical history presented to ED 12/23 with left posterior neck infection. Incision and draniage by ED staff. Admitted for antibiotics and hyponatremia. Initially treated with bactrim currently on vancomycin, Unasyn.    ORL consulted for further management of left neck infection/abscess    Patient family at bedside. Patient primarily [place] speaking.   Family assisted with interpretation.    Patient noticed a bump on his neck a few days ago. Bump slowly grew with redness and warmth. Per family they noticed a pimple in the area at first. No history of similar symptoms. Fevers, chills, lethargy. No recent travel, no sick contacts. Family states patient is otherwise healthy.     Vital Signs Last 24 Hrs  T(C): 36.6 (24 Dec 2019 13:33), Max: 38.2 (23 Dec 2019 15:53)  T(F): 97.8 (24 Dec 2019 13:33), Max: 100.7 (23 Dec 2019 15:53)  HR: 75 (24 Dec 2019 13:33) (71 - 110)  BP: 105/77 (24 Dec 2019 13:33) (104/52 - 132/75)  BP(mean): --  RR: 18 (24 Dec 2019 13:33) (16 - 18)  SpO2: 99% (24 Dec 2019 13:33) (98% - 99%)    PHYSICAL EXAM:  Well appearing NAD, Syrian speaking  breathing comfortably on RA  OC/OP poor dentition, no masses or lesions  Neck left neck with area of erythema along aspect posterior superior SCM, previous I&D site, cutaneous ulceration,  warmth to touch, 4cm area of induration, central area fluctuate, expressible purulent drainage, no masses     Imaging    A/P    WBC 11.7 to 11    CT neck with contrast 12/24  FINDINGS:    There is a peripherally enhancing low-density collection within the subcutaneous tissues of the left lateral neck along the posterior margin of the sternocleidomastoid superficial to the trapezius there is associated skin thickening and extensive haziness of adjacent fat planes. The collection measures 4.2 x 2.5 0.8 cm there is soft tissue swelling of the underlying musculature compatible with associated myositis.    AERODIGESTIVE TRACT:  Normal.    LYMPH NODES:  No adenopathy.    PAROTID GLANDS:  Normal.    SUBMANDIBULAR GLANDS:  Normal.    THYROID GLAND:  Normal.    VISUALIZED PARANASAL SINUSES:  Mild to moderate mucosal thickening    VISUALIZED TYMPANOMASTOID CAVITIES: Underpneumatized on the left.    BONES:  Normal.    MISCELLANEOUS:  Small right pleural effusion      IMPRESSION:    Abscess collection within the superficial soft tissues of the left lateral neck with associated cellulitis.    PROCEDURE: INCISION AND DRAINAGE  Verbal consent obtained from the patient and family. 2 of 1% lidocaine with [id number] epinephrine was injected subcutaneously around the area of previous incision and drainge. A clamp was used to spread into the already incised skin. 6cc of purulent fluid, cheesy debris was expressed from the abscess. A cotton tip applicator was used to break up loculations. The abscess was copiously irrigated with normal saline. 1/2 inch strip gauze packing was placed into the abscess cavity. A dressing was applied. This concluded the procedure. Patien tolerated procedure well. No immediate complications.    A/P  89 M with no significant past medical history presented to ED 12/23 with left posterior neck infection. Incision and drainage by ED staff. Admitted for antibiotics and hyponatremia. Initially treated with bactrim currently on vancomycin, unasyn.      Left posterior neck infection, abscess     -s/p drainage of left neck abscess at site of previous I&D 12/24  -continue antibiotics  -abx per primary team  -f/u culture   -ORL will manage packing material, will likely slowly remove over the next few days  -change left neck dressing as needed for soiling  -warm compress to left posterior neck   -will follow  -call/page with questions  -will discuss with attending and update team with any further recommendations

## 2019-12-24 NOTE — PROVIDER CONTACT NOTE (OTHER) - ASSESSMENT
/52, HR 85, T 97.6, RR 18, oxygen sat RA 98%, denies chest pain, asymptomatic, alert and responsive, not in distress

## 2019-12-24 NOTE — PROGRESS NOTE ADULT - SUBJECTIVE AND OBJECTIVE BOX
Darvin Lehman MD (PGY 1, Internal Medicine)  Pager: [phone]                  ____________________  SUBJ:  NAEO. Endorses pain on neck.   Denies any fevers, chills, diaphoresis, headache, pain moving neck, difficulty swallowing, or opening mouth.   ____________________  PHYSICAL EXAM:  General: No acute distress.  	HEENT: NCAT.  PERRL.  EOMI.  No scleral icterus or injection.  Moist MM.  No oropharyngeal exudates. Poor dentition  	Neck: Supple.  Full ROM.  No JVD.  + neck abscess on L posterior neck with small incision at the center, tender to palpation.   Nonpurulent drainage. Remains elevated with surrounding erythema.   	Heart: Irregular rate. Normal S1 and S2.  No murmurs, rubs, or gallops.   	Lungs: CTAB. No wheezes, crackles, or rhonchi.    	Abdomen: BS+, soft, NT/ND.   	Skin: induration of L posterior neck with pus draining as well as blood   	Extremities: No edema, clubbing, or cyanosis.    	Musculoskeletal: No deformities.  No spinal or paraspinal tenderness.  Neuro: A&Ox3.  Moving all extremities, no neuro focal deficits	    MEDICATIONS  (STANDING):  ampicillin/sulbactam  IVPB 1.5 Gram(s) IV Intermittent every 6 hours  enoxaparin Injectable 40 milliGRAM(s) SubCutaneous daily  potassium phosphate / sodium phosphate powder 1 Packet(s) Oral four times a day before meals  vancomycin  IVPB      vancomycin  IVPB 1000 milliGRAM(s) IV Intermittent every 12 hours    MEDICATIONS  (PRN):  acetaminophen   Tablet .. 650 milliGRAM(s) Oral every 6 hours PRN Temp greater or equal to 38C (100.4F), Moderate Pain (4 - 6)    ____________________  VITALS:  Vital Signs Last 24 Hrs  T(C): 36.6 (24 Dec 2019 13:33), Max: 38.2 (23 Dec 2019 15:53)  T(F): 97.8 (24 Dec 2019 13:33), Max: 100.7 (23 Dec 2019 15:53)  HR: 75 (24 Dec 2019 13:33) (71 - 110)  BP: 105/77 (24 Dec 2019 13:33) (104/52 - 132/75)  BP(mean): --  RR: 18 (24 Dec 2019 13:33) (16 - 22)  SpO2: 99% (24 Dec 2019 13:33) (98% - 99%)  ____________________  LABS:                        11.7   11.00 )-----------( 159      ( 24 Dec 2019 07:00 )             35.0     12-24    127<L>  |  95<L>  |  11  ----------------------------<  108<H>  3.6   |  20<L>  |  0.70    Ca    7.9<L>      24 Dec 2019 07:00  Phos  2.6     12-24  Mg     1.8     12-24    TPro  5.9<L>  /  Alb  3.0<L>  /  TBili  2.1<H>  /  DBili  x   /  AST  33  /  ALT  24  /  AlkPhos  109  12-24          I&O's Summary    Creatinine Trend: 0.70<--, 0.71<--, 0.77<--             11.7   11.00 )-----------( 159      ( 12-24 @ 07:00 )             35.0                12.3   11.70 )-----------( 158      ( 12-23 @ 11:00 )             36.8 Darvin Lehman MD (PGY 1, Internal Medicine)  Pager: [phone]                  ____________________  SUBJ:  NAEO. Endorses pain on neck.   Denies any fevers, chills, diaphoresis, headache, pain moving neck, difficulty swallowing, or opening mouth.   ____________________  PHYSICAL EXAM:  General: No acute distress.  	HEENT: NCAT.  PERRL.  EOMI.  No scleral icterus or injection.  Moist MM.  No oropharyngeal exudates. Poor dentition  	Neck: Supple.  Full ROM.  No JVD.  + neck abscess on L posterior neck with small incision at the center, tender to palpation.   Nonpurulent drainage. Remains elevated with surrounding erythema.   	Heart: Irregular rate. Normal S1 and S2.  No murmurs, rubs, or gallops.   	Lungs: CTAB. No wheezes, crackles, or rhonchi.    	Abdomen: BS+, soft, NT/ND.   	Skin: induration of L posterior neck with pus draining as well as blood   	Extremities: No edema, clubbing, or cyanosis.    	Musculoskeletal: No deformities.  No spinal or paraspinal tenderness.  Neuro: A&Ox1.  Moving all extremities, no neuro focal deficits	    MEDICATIONS  (STANDING):  ampicillin/sulbactam  IVPB 1.5 Gram(s) IV Intermittent every 6 hours  enoxaparin Injectable 40 milliGRAM(s) SubCutaneous daily  potassium phosphate / sodium phosphate powder 1 Packet(s) Oral four times a day before meals  vancomycin  IVPB      vancomycin  IVPB 1000 milliGRAM(s) IV Intermittent every 12 hours    MEDICATIONS  (PRN):  acetaminophen   Tablet .. 650 milliGRAM(s) Oral every 6 hours PRN Temp greater or equal to 38C (100.4F), Moderate Pain (4 - 6)    ____________________  VITALS:  Vital Signs Last 24 Hrs  T(C): 36.6 (24 Dec 2019 13:33), Max: 38.2 (23 Dec 2019 15:53)  T(F): 97.8 (24 Dec 2019 13:33), Max: 100.7 (23 Dec 2019 15:53)  HR: 75 (24 Dec 2019 13:33) (71 - 110)  BP: 105/77 (24 Dec 2019 13:33) (104/52 - 132/75)  BP(mean): --  RR: 18 (24 Dec 2019 13:33) (16 - 22)  SpO2: 99% (24 Dec 2019 13:33) (98% - 99%)  ____________________  LABS:                        11.7   11.00 )-----------( 159      ( 24 Dec 2019 07:00 )             35.0     12-24    127<L>  |  95<L>  |  11  ----------------------------<  108<H>  3.6   |  20<L>  |  0.70    Ca    7.9<L>      24 Dec 2019 07:00  Phos  2.6     12-24  Mg     1.8     12-24    TPro  5.9<L>  /  Alb  3.0<L>  /  TBili  2.1<H>  /  DBili  x   /  AST  33  /  ALT  24  /  AlkPhos  109  12-24          I&O's Summary    Creatinine Trend: 0.70<--, 0.71<--, 0.77<--             11.7   11.00 )-----------( 159      ( 12-24 @ 07:00 )             35.0                12.3   11.70 )-----------( 158      ( 12-23 @ 11:00 )             36.8

## 2019-12-24 NOTE — PROVIDER CONTACT NOTE (OTHER) - ASSESSMENT
, T 98.2, /75, RR 18, oxygen sat RA 98%, denies chest pain, asymptomatic, alert and responsive, not in distress

## 2019-12-25 LAB
ANION GAP SERPL CALC-SCNC: 9 MMO/L — SIGNIFICANT CHANGE UP (ref 7–14)
BASE EXCESS BLDV CALC-SCNC: 1 MMOL/L — SIGNIFICANT CHANGE UP
BUN SERPL-MCNC: 14 MG/DL — SIGNIFICANT CHANGE UP (ref 7–23)
CALCIUM SERPL-MCNC: 8.4 MG/DL — SIGNIFICANT CHANGE UP (ref 8.4–10.5)
CHLORIDE SERPL-SCNC: 99 MMOL/L — SIGNIFICANT CHANGE UP (ref 98–107)
CO2 SERPL-SCNC: 21 MMOL/L — LOW (ref 22–31)
CORTIS SERPL-MCNC: 17 UG/DL — SIGNIFICANT CHANGE UP (ref 2.7–18.4)
CREAT SERPL-MCNC: 0.62 MG/DL — SIGNIFICANT CHANGE UP (ref 0.5–1.3)
GAS PNL BLDV: 126 MMOL/L — LOW (ref 136–146)
GLUCOSE BLDV-MCNC: 98 MG/DL — SIGNIFICANT CHANGE UP (ref 70–99)
GLUCOSE SERPL-MCNC: 129 MG/DL — HIGH (ref 70–99)
HCO3 BLDV-SCNC: 26 MMOL/L — SIGNIFICANT CHANGE UP (ref 20–27)
HCT VFR BLD CALC: 35.4 % — LOW (ref 39–50)
HCT VFR BLDV CALC: 36.6 % — LOW (ref 39–51)
HGB BLD-MCNC: 12.1 G/DL — LOW (ref 13–17)
HGB BLDV-MCNC: 11.9 G/DL — LOW (ref 13–17)
HIV 1+2 AB+HIV1 P24 AG SERPL QL IA: SIGNIFICANT CHANGE UP
MAGNESIUM SERPL-MCNC: 2.1 MG/DL — SIGNIFICANT CHANGE UP (ref 1.6–2.6)
MCHC RBC-ENTMCNC: 31.7 PG — SIGNIFICANT CHANGE UP (ref 27–34)
MCHC RBC-ENTMCNC: 34.2 % — SIGNIFICANT CHANGE UP (ref 32–36)
MCV RBC AUTO: 92.7 FL — SIGNIFICANT CHANGE UP (ref 80–100)
NRBC # FLD: 0 K/UL — SIGNIFICANT CHANGE UP (ref 0–0)
PCO2 BLDV: 36 MMHG — LOW (ref 41–51)
PH BLDV: 7.45 PH — HIGH (ref 7.32–7.43)
PHOSPHATE SERPL-MCNC: 2.9 MG/DL — SIGNIFICANT CHANGE UP (ref 2.5–4.5)
PLATELET # BLD AUTO: 182 K/UL — SIGNIFICANT CHANGE UP (ref 150–400)
PMV BLD: 9.5 FL — SIGNIFICANT CHANGE UP (ref 7–13)
PO2 BLDV: 67 MMHG — HIGH (ref 35–40)
POTASSIUM BLDV-SCNC: 3.9 MMOL/L — SIGNIFICANT CHANGE UP (ref 3.4–4.5)
POTASSIUM SERPL-MCNC: 4 MMOL/L — SIGNIFICANT CHANGE UP (ref 3.5–5.3)
POTASSIUM SERPL-SCNC: 4 MMOL/L — SIGNIFICANT CHANGE UP (ref 3.5–5.3)
RBC # BLD: 3.82 M/UL — LOW (ref 4.2–5.8)
RBC # FLD: 12.9 % — SIGNIFICANT CHANGE UP (ref 10.3–14.5)
SAO2 % BLDV: 94.7 % — HIGH (ref 60–85)
SODIUM SERPL-SCNC: 129 MMOL/L — LOW (ref 135–145)
SPECIMEN SOURCE: SIGNIFICANT CHANGE UP
SPECIMEN SOURCE: SIGNIFICANT CHANGE UP
VANCOMYCIN TROUGH SERPL-MCNC: 9.4 UG/ML — LOW (ref 10–20)
WBC # BLD: 8.7 K/UL — SIGNIFICANT CHANGE UP (ref 3.8–10.5)
WBC # FLD AUTO: 8.7 K/UL — SIGNIFICANT CHANGE UP (ref 3.8–10.5)

## 2019-12-25 PROCEDURE — 99233 SBSQ HOSP IP/OBS HIGH 50: CPT

## 2019-12-25 RX ORDER — VANCOMYCIN HCL 1 G
1250 VIAL (EA) INTRAVENOUS EVERY 12 HOURS
Refills: 0 | Status: DISCONTINUED | OUTPATIENT
Start: 2019-12-26 | End: 2019-12-27

## 2019-12-25 RX ORDER — VANCOMYCIN HCL 1 G
1250 VIAL (EA) INTRAVENOUS ONCE
Refills: 0 | Status: COMPLETED | OUTPATIENT
Start: 2019-12-25 | End: 2019-12-25

## 2019-12-25 RX ORDER — VANCOMYCIN HCL 1 G
VIAL (EA) INTRAVENOUS
Refills: 0 | Status: DISCONTINUED | OUTPATIENT
Start: 2019-12-25 | End: 2019-12-27

## 2019-12-25 RX ADMIN — AMPICILLIN SODIUM AND SULBACTAM SODIUM 100 GRAM(S): 250; 125 INJECTION, POWDER, FOR SUSPENSION INTRAMUSCULAR; INTRAVENOUS at 18:20

## 2019-12-25 RX ADMIN — Medication 166.67 MILLIGRAM(S): at 20:50

## 2019-12-25 RX ADMIN — AMPICILLIN SODIUM AND SULBACTAM SODIUM 100 GRAM(S): 250; 125 INJECTION, POWDER, FOR SUSPENSION INTRAMUSCULAR; INTRAVENOUS at 13:48

## 2019-12-25 RX ADMIN — AMPICILLIN SODIUM AND SULBACTAM SODIUM 100 GRAM(S): 250; 125 INJECTION, POWDER, FOR SUSPENSION INTRAMUSCULAR; INTRAVENOUS at 05:04

## 2019-12-25 RX ADMIN — ENOXAPARIN SODIUM 40 MILLIGRAM(S): 100 INJECTION SUBCUTANEOUS at 12:31

## 2019-12-25 RX ADMIN — AMPICILLIN SODIUM AND SULBACTAM SODIUM 100 GRAM(S): 250; 125 INJECTION, POWDER, FOR SUSPENSION INTRAMUSCULAR; INTRAVENOUS at 00:02

## 2019-12-25 RX ADMIN — Medication 250 MILLIGRAM(S): at 05:03

## 2019-12-25 NOTE — PROGRESS NOTE ADULT - PROBLEM SELECTOR PLAN 3
- patient hyponatremic to 125 on admission, s/p 500cc bolus with no improvement in sodium level  - low serum osmolality, urine lytes consistent with SIADH  - TSH wnl  - f/u AM cortisol  - Fluid restriction 800cc

## 2019-12-25 NOTE — PROGRESS NOTE ADULT - PROBLEM SELECTOR PLAN 1
- patient with L sided neck abscess s/p drainage, s/p Bactrim  - f/u cultures of wound  - CT Neck: Abscess collection within the superficial soft tissues of the left lateral neck with associated cellulitis.  - Continue vanc/unasyn (12/24  - )  - Will consult plastics for further management of abscess and need for additional drainage. - patient with L sided neck abscess s/p drainage, s/p Bactrim  - Wound cx 12/24: Staph A   - CT Neck: Abscess collection within the superficial soft tissues of the left lateral neck with associated cellulitis.  - Continue vanc/unasyn (12/24  - )  - s/p I&D by ENT 12/24: follow up cultures.   -ORL will manage packing material, will likely slowly remove over the next few days  -change left neck dressing as needed for soiling  -warm compress to left posterior neck

## 2019-12-25 NOTE — PROGRESS NOTE ADULT - SUBJECTIVE AND OBJECTIVE BOX
Darvin Lehman MD (PGY 1, Internal Medicine)  Pager: [phone]                  ____________________  SUBJ:    ____________________  PHYSICAL EXAM:  General: No acute distress.  	HEENT: NCAT.  PERRL.  EOMI.  No scleral icterus or injection.  Moist MM.  No oropharyngeal exudates. Poor dentition  	Neck: Supple.  Full ROM.  No JVD.   L posterior neck erythema with small incision at the center, Nontender to palpation.   Nonpurulent drainage. Remains elevated with surrounding erythema.   	Heart: Irregular rate. Normal S1 and S2.  No murmurs, rubs, or gallops.   	Lungs: CTAB. No wheezes, crackles, or rhonchi.    	Abdomen: BS+, soft, NT/ND.   	Skin: induration of L posterior neck with pus draining as well as blood   	Extremities: No edema, clubbing, or cyanosis.    	Musculoskeletal: No deformities.  No spinal or paraspinal tenderness.  Neuro: A&Ox1.  Moving all extremities, no neuro focal deficits	    MEDICATIONS  (STANDING):  ampicillin/sulbactam  IVPB 1.5 Gram(s) IV Intermittent every 6 hours  enoxaparin Injectable 40 milliGRAM(s) SubCutaneous daily  vancomycin  IVPB      vancomycin  IVPB 1000 milliGRAM(s) IV Intermittent every 12 hours    MEDICATIONS  (PRN):  acetaminophen   Tablet .. 650 milliGRAM(s) Oral every 6 hours PRN Temp greater or equal to 38C (100.4F), Moderate Pain (4 - 6)    ____________________  VITALS:  Vital Signs Last 24 Hrs  T(C): 36.6 (25 Dec 2019 05:01), Max: 37 (24 Dec 2019 21:42)  T(F): 97.8 (25 Dec 2019 05:01), Max: 98.6 (24 Dec 2019 21:42)  HR: 70 (25 Dec 2019 05:01) (67 - 75)  BP: 102/63 (25 Dec 2019 05:01) (99/57 - 107/68)  BP(mean): --  RR: 17 (25 Dec 2019 05:01) (17 - 18)  SpO2: 95% (25 Dec 2019 05:01) (95% - 99%)  ____________________  LABS:                        11.7   11.00 )-----------( 159      ( 24 Dec 2019 07:00 )             35.0     12-24    127<L>  |  95<L>  |  11  ----------------------------<  108<H>  3.6   |  20<L>  |  0.70    Ca    7.9<L>      24 Dec 2019 07:00  Phos  2.6     12-24  Mg     1.8     12-24    TPro  5.9<L>  /  Alb  3.0<L>  /  TBili  2.1<H>  /  DBili  x   /  AST  33  /  ALT  24  /  AlkPhos  109  12-24          I&O's Summary    Creatinine Trend: 0.70<--, 0.71<--, 0.77<--             11.7   11.00 )-----------( 159      ( 12-24 @ 07:00 )             35.0                12.3   11.70 )-----------( 158      ( 12-23 @ 11:00 )             36.8 Darvin Lehman MD (PGY 1, Internal Medicine)  Pager: [phone]                  ____________________  SUBJ:  NAEO. Endorses mild pain in L neck, Otherwise denies any headaches, fevers, chills, trouble swallowing, or moving neck.     ____________________  PHYSICAL EXAM:  General: No acute distress.  	HEENT: NCAT.  PERRL.  EOMI.  No scleral icterus or injection.  Moist MM.  No oropharyngeal exudates. Poor dentition  	Neck: Supple.  Full ROM.  No JVD.   L posterior neck erythema with small incision at the center, Nontender to palpation.   Nonpurulent drainage. Remains elevated with surrounding erythema.   	Heart: Irregular rate. Normal S1 and S2.  No murmurs, rubs, or gallops.   	Lungs: CTAB. No wheezes, crackles, or rhonchi.    	Abdomen: BS+, soft, NT/ND.   	Skin: induration of L posterior neck with pus draining as well as blood   	Extremities: No edema, clubbing, or cyanosis.    	Musculoskeletal: No deformities.  No spinal or paraspinal tenderness.  Neuro: A&Ox1.  Moving all extremities, no neuro focal deficits	    MEDICATIONS  (STANDING):  ampicillin/sulbactam  IVPB 1.5 Gram(s) IV Intermittent every 6 hours  enoxaparin Injectable 40 milliGRAM(s) SubCutaneous daily  vancomycin  IVPB      vancomycin  IVPB 1000 milliGRAM(s) IV Intermittent every 12 hours    MEDICATIONS  (PRN):  acetaminophen   Tablet .. 650 milliGRAM(s) Oral every 6 hours PRN Temp greater or equal to 38C (100.4F), Moderate Pain (4 - 6)    ____________________  VITALS:  Vital Signs Last 24 Hrs  T(C): 36.6 (25 Dec 2019 05:01), Max: 37 (24 Dec 2019 21:42)  T(F): 97.8 (25 Dec 2019 05:01), Max: 98.6 (24 Dec 2019 21:42)  HR: 70 (25 Dec 2019 05:01) (67 - 75)  BP: 102/63 (25 Dec 2019 05:01) (99/57 - 107/68)  BP(mean): --  RR: 17 (25 Dec 2019 05:01) (17 - 18)  SpO2: 95% (25 Dec 2019 05:01) (95% - 99%)  ____________________  LABS:                        11.7   11.00 )-----------( 159      ( 24 Dec 2019 07:00 )             35.0     12-24    127<L>  |  95<L>  |  11  ----------------------------<  108<H>  3.6   |  20<L>  |  0.70    Ca    7.9<L>      24 Dec 2019 07:00  Phos  2.6     12-24  Mg     1.8     12-24    TPro  5.9<L>  /  Alb  3.0<L>  /  TBili  2.1<H>  /  DBili  x   /  AST  33  /  ALT  24  /  AlkPhos  109  12-24          I&O's Summary    Creatinine Trend: 0.70<--, 0.71<--, 0.77<--             11.7   11.00 )-----------( 159      ( 12-24 @ 07:00 )             35.0                12.3   11.70 )-----------( 158      ( 12-23 @ 11:00 )             36.8

## 2019-12-25 NOTE — PROGRESS NOTE ADULT - ASSESSMENT
89 M with no significant past medical history presents with abscess on the L side of his neck, s/p drainage, now on vancomycin and unasyn.     Patient also found to be hyponatremia. 89 M with no significant past medical history presents with abscess on the L side of his neck, s/p drainage, now on vancomycin and unasyn.    Patient also found to be hyponatremia, likely 2/2 SIADH

## 2019-12-25 NOTE — PROVIDER CONTACT NOTE (OTHER) - BACKGROUND
Pt admitted for fever, presented with abscess on L side of his neck. Pt also hyponatremic. No pertinent PMH

## 2019-12-25 NOTE — PROGRESS NOTE ADULT - PROBLEM SELECTOR PLAN 2
- patient septic on admission (febrile, tachypneic 2/2 to neck abscess)  - f/u cultures of neck abscess drainage  - f/u blood cultures  - elevated lactate 2.3, will repeat s/p fluids  - f/u HIV test

## 2019-12-25 NOTE — PROGRESS NOTE ADULT - SUBJECTIVE AND OBJECTIVE BOX
No acute events overnight.    Vital Signs Last 24 Hrs  T(C): 36.6 (25 Dec 2019 05:01), Max: 37 (24 Dec 2019 21:42)  T(F): 97.8 (25 Dec 2019 05:01), Max: 98.6 (24 Dec 2019 21:42)  HR: 70 (25 Dec 2019 05:01) (67 - 75)  BP: 102/63 (25 Dec 2019 05:01) (99/57 - 107/68)  BP(mean): --  RR: 17 (25 Dec 2019 05:01) (17 - 18)  SpO2: 95% (25 Dec 2019 05:01) (95% - 99%)      Well appearing NAD, Icelandic speaking  Neck left neck with area of erythema along aspect posterior superior SCM, induration, skin ulceration, dressing in place, packing in abscess cavity  Portion of packing material removed, some packing material remains in abscess cavity    A/P  89 M with no significant past medical history presented to ED 12/23 with left posterior neck infection. Incision and drainage by ED staff. Admitted for antibiotics and hyponatremia. Initially treated with bactrim currently on vancomycin, unasyn.      Left posterior neck infection, abscess     -s/p drainage of left neck abscess at site of previous I&D 12/24  -continue antibiotics  -abx per primary team  -f/u culture   -ORL will manage packing material, will likely slowly remove over the next few days  -change left neck dressing as needed for soiling  -warm compress to left posterior neck   -will follow  -call/page with questions

## 2019-12-26 LAB
-  CEFAZOLIN: SIGNIFICANT CHANGE UP
-  CIPROFLOXACIN: SIGNIFICANT CHANGE UP
-  CLINDAMYCIN: SIGNIFICANT CHANGE UP
-  ERYTHROMYCIN: SIGNIFICANT CHANGE UP
-  GENTAMICIN: SIGNIFICANT CHANGE UP
-  LEVOFLOXACIN: SIGNIFICANT CHANGE UP
-  MOXIFLOXACIN(AEROBIC): SIGNIFICANT CHANGE UP
-  OXACILLIN: SIGNIFICANT CHANGE UP
-  PENICILLIN: SIGNIFICANT CHANGE UP
-  RIFAMPIN.: SIGNIFICANT CHANGE UP
-  TETRACYCLINE: SIGNIFICANT CHANGE UP
-  TRIMETHOPRIM/SULFAMETHOXAZOLE: SIGNIFICANT CHANGE UP
-  VANCOMYCIN: SIGNIFICANT CHANGE UP
ANION GAP SERPL CALC-SCNC: 12 MMO/L — SIGNIFICANT CHANGE UP (ref 7–14)
BUN SERPL-MCNC: 10 MG/DL — SIGNIFICANT CHANGE UP (ref 7–23)
CALCIUM SERPL-MCNC: 8.2 MG/DL — LOW (ref 8.4–10.5)
CHLORIDE SERPL-SCNC: 99 MMOL/L — SIGNIFICANT CHANGE UP (ref 98–107)
CO2 SERPL-SCNC: 21 MMOL/L — LOW (ref 22–31)
CREAT SERPL-MCNC: 0.62 MG/DL — SIGNIFICANT CHANGE UP (ref 0.5–1.3)
CULTURE RESULTS: SIGNIFICANT CHANGE UP
GLUCOSE SERPL-MCNC: 87 MG/DL — SIGNIFICANT CHANGE UP (ref 70–99)
GRAM STN SPEC: SIGNIFICANT CHANGE UP
HCT VFR BLD CALC: 35.2 % — LOW (ref 39–50)
HGB BLD-MCNC: 11.6 G/DL — LOW (ref 13–17)
MAGNESIUM SERPL-MCNC: 2.1 MG/DL — SIGNIFICANT CHANGE UP (ref 1.6–2.6)
MCHC RBC-ENTMCNC: 30.8 PG — SIGNIFICANT CHANGE UP (ref 27–34)
MCHC RBC-ENTMCNC: 33 % — SIGNIFICANT CHANGE UP (ref 32–36)
MCV RBC AUTO: 93.4 FL — SIGNIFICANT CHANGE UP (ref 80–100)
METHOD TYPE: SIGNIFICANT CHANGE UP
NRBC # FLD: 0 K/UL — SIGNIFICANT CHANGE UP (ref 0–0)
ORGANISM # SPEC MICROSCOPIC CNT: SIGNIFICANT CHANGE UP
ORGANISM # SPEC MICROSCOPIC CNT: SIGNIFICANT CHANGE UP
PHOSPHATE SERPL-MCNC: 3.1 MG/DL — SIGNIFICANT CHANGE UP (ref 2.5–4.5)
PLATELET # BLD AUTO: 179 K/UL — SIGNIFICANT CHANGE UP (ref 150–400)
PMV BLD: 9.8 FL — SIGNIFICANT CHANGE UP (ref 7–13)
POTASSIUM SERPL-MCNC: 3.9 MMOL/L — SIGNIFICANT CHANGE UP (ref 3.5–5.3)
POTASSIUM SERPL-SCNC: 3.9 MMOL/L — SIGNIFICANT CHANGE UP (ref 3.5–5.3)
RBC # BLD: 3.77 M/UL — LOW (ref 4.2–5.8)
RBC # FLD: 13 % — SIGNIFICANT CHANGE UP (ref 10.3–14.5)
SODIUM SERPL-SCNC: 132 MMOL/L — LOW (ref 135–145)
VANCOMYCIN TROUGH SERPL-MCNC: 16 UG/ML — SIGNIFICANT CHANGE UP (ref 10–20)
WBC # BLD: 6.54 K/UL — SIGNIFICANT CHANGE UP (ref 3.8–10.5)
WBC # FLD AUTO: 6.54 K/UL — SIGNIFICANT CHANGE UP (ref 3.8–10.5)

## 2019-12-26 PROCEDURE — 99233 SBSQ HOSP IP/OBS HIGH 50: CPT | Mod: GC

## 2019-12-26 RX ADMIN — Medication 166.67 MILLIGRAM(S): at 20:14

## 2019-12-26 RX ADMIN — Medication 166.67 MILLIGRAM(S): at 07:53

## 2019-12-26 RX ADMIN — ENOXAPARIN SODIUM 40 MILLIGRAM(S): 100 INJECTION SUBCUTANEOUS at 12:08

## 2019-12-26 RX ADMIN — AMPICILLIN SODIUM AND SULBACTAM SODIUM 100 GRAM(S): 250; 125 INJECTION, POWDER, FOR SUSPENSION INTRAMUSCULAR; INTRAVENOUS at 00:32

## 2019-12-26 RX ADMIN — AMPICILLIN SODIUM AND SULBACTAM SODIUM 100 GRAM(S): 250; 125 INJECTION, POWDER, FOR SUSPENSION INTRAMUSCULAR; INTRAVENOUS at 23:43

## 2019-12-26 RX ADMIN — AMPICILLIN SODIUM AND SULBACTAM SODIUM 100 GRAM(S): 250; 125 INJECTION, POWDER, FOR SUSPENSION INTRAMUSCULAR; INTRAVENOUS at 18:02

## 2019-12-26 RX ADMIN — AMPICILLIN SODIUM AND SULBACTAM SODIUM 100 GRAM(S): 250; 125 INJECTION, POWDER, FOR SUSPENSION INTRAMUSCULAR; INTRAVENOUS at 05:01

## 2019-12-26 RX ADMIN — AMPICILLIN SODIUM AND SULBACTAM SODIUM 100 GRAM(S): 250; 125 INJECTION, POWDER, FOR SUSPENSION INTRAMUSCULAR; INTRAVENOUS at 12:08

## 2019-12-26 NOTE — PROGRESS NOTE ADULT - SUBJECTIVE AND OBJECTIVE BOX
No acute events overnight.    Vital Signs Last 24 Hrs  T(C): 36.7 (26 Dec 2019 04:57), Max: 36.8 (25 Dec 2019 13:01)  T(F): 98.1 (26 Dec 2019 04:57), Max: 98.2 (25 Dec 2019 13:01)  HR: 64 (26 Dec 2019 04:57) (64 - 86)  BP: 101/66 (26 Dec 2019 04:57) (96/57 - 102/56)  BP(mean): --  RR: 18 (26 Dec 2019 04:57) (18 - 19)  SpO2: 97% (26 Dec 2019 04:57) (97% - 98%)    Well appearing NAD, [place]ian speaking  Neck left neck with area of erythema along aspect posterior superior SCM, induration, skin ulceration, dressing in place, packing in abscess cavity  Packing replaced, still with purulent fluid draining from wound    A/P  89 M with no significant past medical history presented to ED 12/23 with left posterior neck infection. Incision and drainage by ED staff. Admitted for antibiotics and hyponatremia. Initially treated with bactrim currently on vancomycin, unasyn.      Left posterior neck infection, abscess     -s/p drainage of left neck abscess at site of previous I&D 12/24  -continue antibiotics  -abx per primary team  -f/u culture: staph aureus  -will change packing daily  -change left neck dressing as needed for soiling  -warm compress to left posterior neck   -will follow  -call/page with questions

## 2019-12-26 NOTE — PROGRESS NOTE ADULT - PROBLEM SELECTOR PLAN 1
- patient with L sided neck abscess s/p drainage, s/p Bactrim  - Wound cx 12/24: Staph A   - CT Neck: Abscess collection within the superficial soft tissues of the left lateral neck with associated cellulitis.  - Continue vanc/unasyn (12/24  - )  - s/p I&D by ENT 12/24: follow up cultures.   -ORL will manage packing material, will likely slowly remove over the next few days  -change left neck dressing as needed for soiling  -warm compress to left posterior neck - patient with L sided neck abscess s/p drainage, s/p Bactrim  - Wound cx 12/24: Staph A   - CT Neck: Abscess collection within the superficial soft tissues of the left lateral neck with associated cellulitis.  - Continue vanc/unasyn (12/24  - )  - s/p I&D by ENT 12/24: follow up cultures.   -ORL will manage packing material: daily dressing changes  -change left neck dressing as needed for soiling  -warm compress to left posterior neck - patient with L sided neck abscess s/p drainage, s/p Bactrim as outpatient  - Wound cx 12/24: Staph A   - CT Neck: Abscess collection within the superficial soft tissues of the left lateral neck with associated cellulitis.  - Continue vanc/unasyn (12/24  - )  - s/p I&D by ENT 12/24: follow up cultures.   -ORL will manage packing material: daily dressing changes  -change left neck dressing as needed for soiling  -warm compress to left posterior neck

## 2019-12-26 NOTE — PROGRESS NOTE ADULT - PROBLEM SELECTOR PLAN 2
- patient septic on admission (febrile, tachypneic 2/2 to neck abscess)  - f/u cultures of neck abscess drainage  - f/u blood cultures  - elevated lactate 2.3, will repeat s/p fluids  - f/u HIV test - patient septic on admission (febrile, tachypneic 2/2 to neck abscess)  - f/u cultures of neck abscess drainage  - BCX 12/23 NGTD  - f/u HIV test

## 2019-12-26 NOTE — DISCHARGE NOTE NURSING/CASE MANAGEMENT/SOCIAL WORK - PATIENT PORTAL LINK FT
You can access the FollowMyHealth Patient Portal offered by [facility] by registering at the following website: [email or web address]. By joining [facility]’s FollowMyHealth portal, you will also be able to view your health information using other applications (apps) compatible with our system.

## 2019-12-26 NOTE — PROGRESS NOTE ADULT - SUBJECTIVE AND OBJECTIVE BOX
Darvin Lehman MD (PGY 1, Internal Medicine)  Pager: [phone]                  ____________________  SUBJ:    ____________________  PHYSICAL EXAM:  General: No acute distress.  	HEENT: NCAT.  PERRL.  EOMI.  No scleral icterus or injection.  Moist MM.  No oropharyngeal exudates. Poor dentition  	Neck: Supple.  Full ROM.  No JVD.   L posterior neck erythema with small incision at the center, Nontender to palpation.   Nonpurulent drainage. Remains elevated with surrounding erythema.   	Heart: Irregular rate. Normal S1 and S2.  No murmurs, rubs, or gallops.   	Lungs: CTAB. No wheezes, crackles, or rhonchi.    	Abdomen: BS+, soft, NT/ND.   	Skin: induration of L posterior neck with pus draining as well as blood   	Extremities: No edema, clubbing, or cyanosis.    	Musculoskeletal: No deformities.  No spinal or paraspinal tenderness.  Neuro: A&Ox1.  Moving all extremities, no neuro focal deficits	    MEDICATIONS  (STANDING):  ampicillin/sulbactam  IVPB 1.5 Gram(s) IV Intermittent every 6 hours  enoxaparin Injectable 40 milliGRAM(s) SubCutaneous daily  vancomycin  IVPB 1250 milliGRAM(s) IV Intermittent every 12 hours  vancomycin  IVPB        MEDICATIONS  (PRN):  acetaminophen   Tablet .. 650 milliGRAM(s) Oral every 6 hours PRN Temp greater or equal to 38C (100.4F), Moderate Pain (4 - 6)    ____________________  VITALS:  Vital Signs Last 24 Hrs  T(C): 36.7 (26 Dec 2019 04:57), Max: 36.8 (25 Dec 2019 13:01)  T(F): 98.1 (26 Dec 2019 04:57), Max: 98.2 (25 Dec 2019 13:01)  HR: 64 (26 Dec 2019 04:57) (64 - 86)  BP: 101/66 (26 Dec 2019 04:57) (96/57 - 102/56)  BP(mean): --  RR: 18 (26 Dec 2019 04:57) (18 - 19)  SpO2: 97% (26 Dec 2019 04:57) (97% - 98%)  ____________________  LABS:                        11.6   6.54  )-----------( 179      ( 26 Dec 2019 03:21 )             35.2     12-26    132<L>  |  99  |  10  ----------------------------<  87  3.9   |  21<L>  |  0.62    Ca    8.2<L>      26 Dec 2019 03:22  Phos  3.1     12-26  Mg     2.1     12-26            I&O's Summary    Creatinine Trend: 0.62<--, 0.62<--, 0.70<--, 0.71<--, 0.77<--             11.6   6.54  )-----------( 179      ( 12-26 @ 03:21 )             35.2                12.1   8.70  )-----------( 182      ( 12-25 @ 14:29 )             35.4                11.7   11.00 )-----------( 159      ( 12-24 @ 07:00 )             35.0                12.3   11.70 )-----------( 158      ( 12-23 @ 11:00 )             36.8 Darvin Lehman MD (PGY 1, Internal Medicine)  Pager: [phone]                  ____________________  SUBJ:  NAEO. Denies any pain. Eating and turning neck without issue. Denies any fevers, headache, nausea, vomiting, diarrhea.   ____________________  PHYSICAL EXAM:  General: No acute distress.  	HEENT: NCAT.  PERRL.  EOMI.  No scleral icterus or injection.  Moist MM.  No oropharyngeal exudates. Poor dentition  	Neck: Supple.  Full ROM.  No JVD.   L posterior neck erythema with small incision at the center, Nontender to palpation.  Nonpurulent drainage. No surrounding erythema.   	Heart: Irregular rate. Normal S1 and S2.  No murmurs, rubs, or gallops.   	Lungs: CTAB. No wheezes, crackles, or rhonchi.    	Abdomen: BS+, soft, NT/ND.   	Skin: induration of L posterior neck with pus draining as well as blood   	Extremities: No edema, clubbing, or cyanosis.    	Musculoskeletal: No deformities.  No spinal or paraspinal tenderness.  Neuro: A&Ox1.  Moving all extremities, no neuro focal deficits	    MEDICATIONS  (STANDING):  ampicillin/sulbactam  IVPB 1.5 Gram(s) IV Intermittent every 6 hours  enoxaparin Injectable 40 milliGRAM(s) SubCutaneous daily  vancomycin  IVPB 1250 milliGRAM(s) IV Intermittent every 12 hours  vancomycin  IVPB        MEDICATIONS  (PRN):  acetaminophen   Tablet .. 650 milliGRAM(s) Oral every 6 hours PRN Temp greater or equal to 38C (100.4F), Moderate Pain (4 - 6)    ____________________  VITALS:  Vital Signs Last 24 Hrs  T(C): 36.7 (26 Dec 2019 04:57), Max: 36.8 (25 Dec 2019 13:01)  T(F): 98.1 (26 Dec 2019 04:57), Max: 98.2 (25 Dec 2019 13:01)  HR: 64 (26 Dec 2019 04:57) (64 - 86)  BP: 101/66 (26 Dec 2019 04:57) (96/57 - 102/56)  BP(mean): --  RR: 18 (26 Dec 2019 04:57) (18 - 19)  SpO2: 97% (26 Dec 2019 04:57) (97% - 98%)  ____________________  LABS:                        11.6   6.54  )-----------( 179      ( 26 Dec 2019 03:21 )             35.2     12-26    132<L>  |  99  |  10  ----------------------------<  87  3.9   |  21<L>  |  0.62    Ca    8.2<L>      26 Dec 2019 03:22  Phos  3.1     12-26  Mg     2.1     12-26            I&O's Summary    Creatinine Trend: 0.62<--, 0.62<--, 0.70<--, 0.71<--, 0.77<--             11.6   6.54  )-----------( 179      ( 12-26 @ 03:21 )             35.2                12.1   8.70  )-----------( 182      ( 12-25 @ 14:29 )             35.4                11.7   11.00 )-----------( 159      ( 12-24 @ 07:00 )             35.0                12.3   11.70 )-----------( 158      ( 12-23 @ 11:00 )             36.8

## 2019-12-26 NOTE — PROGRESS NOTE ADULT - ASSESSMENT
89 M with no significant past medical history presents with abscess on the L side of his neck, s/p drainage, now on vancomycin and unasyn.    Patient also found to be hyponatremia, likely 2/2 SIADH 89 M with no significant past medical history presents with abscess on the L side of his neck, s/p drainage, now on vancomycin and unasyn.    Patient also found to be hyponatremia, likely 2/2 SIADH. Now resolving.

## 2019-12-26 NOTE — PHYSICAL THERAPY INITIAL EVALUATION ADULT - PERTINENT HX OF CURRENT PROBLEM, REHAB EVAL
Patient is 89 year old male with no significant past medical history presents with abscess on the left side of his neck

## 2019-12-26 NOTE — PROGRESS NOTE ADULT - PROBLEM SELECTOR PLAN 3
- patient hyponatremic to 125 on admission, s/p 500cc bolus with no improvement in sodium level  - low serum osmolality, urine lytes consistent with SIADH  - TSH wnl  - f/u AM cortisol  - Fluid restriction 800cc - patient hyponatremic to 125 on admission, s/p 500cc bolus with no improvement in sodium level  - low serum osmolality, urine lytes consistent with SIADH  - TSH wnl  - Fluid restriction 800cc

## 2019-12-27 VITALS
TEMPERATURE: 97 F | DIASTOLIC BLOOD PRESSURE: 63 MMHG | HEART RATE: 75 BPM | RESPIRATION RATE: 17 BRPM | SYSTOLIC BLOOD PRESSURE: 107 MMHG | OXYGEN SATURATION: 96 %

## 2019-12-27 LAB
ANION GAP SERPL CALC-SCNC: 13 MMO/L — SIGNIFICANT CHANGE UP (ref 7–14)
BUN SERPL-MCNC: 12 MG/DL — SIGNIFICANT CHANGE UP (ref 7–23)
CALCIUM SERPL-MCNC: 8.5 MG/DL — SIGNIFICANT CHANGE UP (ref 8.4–10.5)
CHLORIDE SERPL-SCNC: 103 MMOL/L — SIGNIFICANT CHANGE UP (ref 98–107)
CO2 SERPL-SCNC: 23 MMOL/L — SIGNIFICANT CHANGE UP (ref 22–31)
CREAT SERPL-MCNC: 0.73 MG/DL — SIGNIFICANT CHANGE UP (ref 0.5–1.3)
GLUCOSE SERPL-MCNC: 92 MG/DL — SIGNIFICANT CHANGE UP (ref 70–99)
HCT VFR BLD CALC: 38 % — LOW (ref 39–50)
HGB BLD-MCNC: 12.6 G/DL — LOW (ref 13–17)
MAGNESIUM SERPL-MCNC: 2.1 MG/DL — SIGNIFICANT CHANGE UP (ref 1.6–2.6)
MCHC RBC-ENTMCNC: 31.2 PG — SIGNIFICANT CHANGE UP (ref 27–34)
MCHC RBC-ENTMCNC: 33.2 % — SIGNIFICANT CHANGE UP (ref 32–36)
MCV RBC AUTO: 94.1 FL — SIGNIFICANT CHANGE UP (ref 80–100)
NRBC # FLD: 0 K/UL — SIGNIFICANT CHANGE UP (ref 0–0)
PHOSPHATE SERPL-MCNC: 3.7 MG/DL — SIGNIFICANT CHANGE UP (ref 2.5–4.5)
PLATELET # BLD AUTO: 229 K/UL — SIGNIFICANT CHANGE UP (ref 150–400)
PMV BLD: 9.6 FL — SIGNIFICANT CHANGE UP (ref 7–13)
POTASSIUM SERPL-MCNC: 3.9 MMOL/L — SIGNIFICANT CHANGE UP (ref 3.5–5.3)
POTASSIUM SERPL-SCNC: 3.9 MMOL/L — SIGNIFICANT CHANGE UP (ref 3.5–5.3)
RBC # BLD: 4.04 M/UL — LOW (ref 4.2–5.8)
RBC # FLD: 12.9 % — SIGNIFICANT CHANGE UP (ref 10.3–14.5)
SODIUM SERPL-SCNC: 139 MMOL/L — SIGNIFICANT CHANGE UP (ref 135–145)
WBC # BLD: 5.91 K/UL — SIGNIFICANT CHANGE UP (ref 3.8–10.5)
WBC # FLD AUTO: 5.91 K/UL — SIGNIFICANT CHANGE UP (ref 3.8–10.5)

## 2019-12-27 PROCEDURE — 99239 HOSP IP/OBS DSCHRG MGMT >30: CPT | Mod: GC

## 2019-12-27 RX ORDER — INFLUENZA VIRUS VACCINE 15; 15; 15; 15 UG/.5ML; UG/.5ML; UG/.5ML; UG/.5ML
0.5 SUSPENSION INTRAMUSCULAR ONCE
Refills: 0 | Status: DISCONTINUED | OUTPATIENT
Start: 2019-12-27 | End: 2019-12-27

## 2019-12-27 RX ADMIN — ENOXAPARIN SODIUM 40 MILLIGRAM(S): 100 INJECTION SUBCUTANEOUS at 13:45

## 2019-12-27 RX ADMIN — AMPICILLIN SODIUM AND SULBACTAM SODIUM 100 GRAM(S): 250; 125 INJECTION, POWDER, FOR SUSPENSION INTRAMUSCULAR; INTRAVENOUS at 05:03

## 2019-12-27 RX ADMIN — AMPICILLIN SODIUM AND SULBACTAM SODIUM 100 GRAM(S): 250; 125 INJECTION, POWDER, FOR SUSPENSION INTRAMUSCULAR; INTRAVENOUS at 13:45

## 2019-12-27 RX ADMIN — Medication 166.67 MILLIGRAM(S): at 09:36

## 2019-12-27 NOTE — PROGRESS NOTE ADULT - PROBLEM SELECTOR PLAN 2
- patient septic on admission (febrile, tachypneic 2/2 to neck abscess)  -Wound cultures; MSSA  - BCX 12/23 NGTD  - f/u HIV test

## 2019-12-27 NOTE — PROGRESS NOTE ADULT - PROBLEM SELECTOR PLAN 8
Transitions of Care Status: Patient without PCP. Appointment pending with [facility] (email sent). Will follow up with Darvin Lehman.

## 2019-12-27 NOTE — DISCHARGE NOTE PROVIDER - NSDCFUADDINST_GEN_ALL_CORE_FT
Darvin Lehman MD  [address] [facility], [address]   Tel: [phone] Darvin Lehman MD  [address] [facility]  [place], [address]   Tel: [phone]    Follow up in ENT clinic:  Call  to schedule an appointment

## 2019-12-27 NOTE — DISCHARGE NOTE PROVIDER - HOSPITAL COURSE
89 M with no significant past medical history presents with abscess on the L side of his neck,89 M with no significant past medical history presents with abscess on the L side of his neck. He initially had an incision and drainage procedure done in the ED; however, CT imaging of his neck demonstrated a continued pocket of infection. ENT was consulted and he underwent incision and drainage again with wound packing. He was empirically treated with vancomycin and unasyn on admission and wound cultures demonstrated MSSA bacteria. Blood cultures demonstrated no bacteremia and he remained normotensive, afebrile and asymptomatic throughout his hospitalization. His wound packing was changed daily by ENT, but has now demonstrated minimal output of pus. He was deemed medically stable for discharge with a continued course of antibiotic treatment as an outpatient. Phyical therapy was also consulted who recommended that he be discharged home, where he will now be discharged with follow up from ENT and medicine.        His hospital course was complicated by hyponatremia, which resolved with 89 M with no significant past medical history presents with abscess on the L side of his neck associated with fevers. He initially had an incision and drainage procedure done in the ED; however, CT imaging of his neck demonstrated a continued pocket of infection. ENT was consulted and he underwent incision and drainage again with wound packing. He was empirically treated with vancomycin and unasyn on admission and wound cultures demonstrated MSSA bacteria. Blood cultures demonstrated no bacteremia and he remained normotensive, afebrile and asymptomatic throughout his hospitalization. His wound packing was changed daily by ENT, but has now demonstrated minimal output of pus.         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         He was deemed medically stable for discharge with a continued course of antibiotic treatment as an outpatient. Phyical therapy was also consulted who recommended that he be discharged home, where he will now be discharged with follow up from ENT and medicine. 89 M with no significant past medical history presents with abscess on the L side of his neck associated with fevers. He initially had an incision and drainage procedure done in the ED; however, CT imaging of his neck demonstrated a residual pocket of infection. ENT was consulted and he underwent incision and drainage again with wound packing. He was empirically treated with vancomycin and Unasyn on admission and wound cultures demonstrated MSSA bacteria. Blood cultures demonstrated no bacteremia and he remained normotensive, afebrile and asymptomatic throughout his hospitalization. His wound packing was changed daily by ENT, but has now demonstrated minimal output of pus.         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         He was deemed medically stable for discharge with a continued course of Augmentin as an outpatient. Phyical therapy was also consulted who recommended that he be discharged home, where he will now be discharged with follow up from ENT and medicine.

## 2019-12-27 NOTE — DISCHARGE NOTE PROVIDER - CARE PROVIDER_API CALL
Darvin Lehman MD  [address] [facility] [place], [address]   Tel: [phone]  Phone: [phone]  Fax: (   )    -  Follow Up Time: 2 weeks

## 2019-12-27 NOTE — PROGRESS NOTE ADULT - ATTENDING COMMENTS
89 M with left neck abscess, s/p I&D by ED and ENT. Minimal purulent drainage today. Culture growing staph aureus. Will discharge with course of Augmentin.    I spent 35 minutes counseling this patient and coordinating their discharge.
89 M with left neck abscess, s/p I&D by ED. Obtaining CT neck to assess for residual collection. Continuing abx. L neck with raised pustule, fluctuant.
89 M with no significant past medical history presents with abscess on the L side of his neck, s/p drainage, now on vancomycin and Unasyn. CT neck- Lt neck abscess with cellulitis s/p drainage by ENT 12/24 cx Staph aureus. 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
89 M with left neck abscess, s/p I&D by ED and ENT. Continued purulent drainage today. Culture growing staph aureus. Continuing abx.

## 2019-12-27 NOTE — DISCHARGE NOTE PROVIDER - PROVIDER TOKENS
FREE:[LAST:[Dominik],FIRST:[Darvin],PHONE:[phone]],FAX:[(   )    -],ADDRESS:[Darvin Lehman MD  [address] [facility], [facility]   Tel: [phone]],FOLLOWUP:[2 weeks]]

## 2019-12-27 NOTE — PROGRESS NOTE ADULT - SUBJECTIVE AND OBJECTIVE BOX
Darvin Lehman MD (PGY 1, Internal Medicine)  Pager: [phone]                  ____________________  SUBJ:  NAEO. Denies any pain. Eating and turning neck without issue. Denies any fevers, headache, nausea, vomiting, diarrhea.   ____________________  PHYSICAL EXAM:  General: No acute distress.  	HEENT: NCAT.  PERRL.  EOMI.  No scleral icterus or injection.  Moist MM.  No oropharyngeal exudates. Poor dentition  	Neck: Supple.  Full ROM.  No JVD.   L posterior neck erythema with small incision at the center, Nontender to palpation.  Nonpurulent drainage. No surrounding erythema.   	Heart: Irregular rate. Normal S1 and S2.  No murmurs, rubs, or gallops.   	Lungs: CTAB. No wheezes, crackles, or rhonchi.    	Abdomen: BS+, soft, NT/ND.   	Skin: induration of L posterior neck with pus draining as well as blood   	Extremities: No edema, clubbing, or cyanosis.    	Musculoskeletal: No deformities.  No spinal or paraspinal tenderness.  Neuro: A&Ox1.  Moving all extremities, no neuro focal deficits	    MEDICATIONS  (STANDING):  ampicillin/sulbactam  IVPB 1.5 Gram(s) IV Intermittent every 6 hours  enoxaparin Injectable 40 milliGRAM(s) SubCutaneous daily  vancomycin  IVPB 1250 milliGRAM(s) IV Intermittent every 12 hours  vancomycin  IVPB        MEDICATIONS  (PRN):  acetaminophen   Tablet .. 650 milliGRAM(s) Oral every 6 hours PRN Temp greater or equal to 38C (100.4F), Moderate Pain (4 - 6)    ____________________  VITALS:  Vital Signs Last 24 Hrs  T(C): 36.3 (27 Dec 2019 13:57), Max: 36.8 (26 Dec 2019 14:51)  T(F): 97.4 (27 Dec 2019 13:57), Max: 98.3 (26 Dec 2019 21:45)  HR: 75 (27 Dec 2019 13:57) (69 - 91)  BP: 107/63 (27 Dec 2019 13:57) (104/63 - 129/69)  BP(mean): --  RR: 17 (27 Dec 2019 13:57) (16 - 18)  SpO2: 96% (27 Dec 2019 13:57) (96% - 98%)  ____________________  LABS:                        12.6   5.91  )-----------( 229      ( 27 Dec 2019 05:40 )             38.0     12-27    139  |  103  |  12  ----------------------------<  92  3.9   |  23  |  0.73    Ca    8.5      27 Dec 2019 05:40  Phos  3.7     12-27  Mg     2.1     12-27            I&O's Summary    Creatinine Trend: 0.73<--, 0.62<--, 0.62<--, 0.70<--, 0.71<--, 0.77<--             12.6   5.91  )-----------( 229      ( 12-27 @ 05:40 )             38.0                11.6   6.54  )-----------( 179      ( 12-26 @ 03:21 )             35.2                12.1   8.70  )-----------( 182      ( 12-25 @ 14:29 )             35.4

## 2019-12-27 NOTE — SBIRT NOTE ADULT - NSSBIRTALCPOSREINDET_GEN_A_CORE
as per patient and family at bedside, patient has one shot of tequila 2-3 times a week.   provided positive reinforcement for the patients healthy choices

## 2019-12-27 NOTE — PROGRESS NOTE ADULT - ASSESSMENT
89 M with no significant past medical history presents with MSSA abscess on the L side of his neck, s/p drainage, now on vancomycin and unasyn.    Patient also found to be hyponatremia, likely 2/2 SIADH. Now resolving.

## 2019-12-27 NOTE — PROGRESS NOTE ADULT - SUBJECTIVE AND OBJECTIVE BOX
No acute events overnight.     Left neck I&D site examined. No packing in place. Scant discharge, no packing placed, dressing applied.     Vital Signs Last 24 Hrs  T(C): 36.4 (27 Dec 2019 05:44), Max: 36.8 (26 Dec 2019 14:51)  T(F): 97.6 (27 Dec 2019 05:44), Max: 98.3 (26 Dec 2019 21:45)  HR: 78 (27 Dec 2019 05:44) (69 - 91)  BP: 127/68 (27 Dec 2019 05:44) (104/63 - 129/69)  BP(mean): --  RR: 16 (27 Dec 2019 05:44) (16 - 18)  SpO2: 96% (27 Dec 2019 05:44) (96% - 98%)    Well appearing NAD, Swedish speaking  Neck left neck with area of erythema along aspect posterior superior SCM, induration, skin ulceration, dressing in place, scant SS drainage expressed    A/P  89 M with no significant past medical history presented to ED 12/23 with left posterior neck infection. Incision and drainage by ED staff. Admitted for antibiotics and hyponatremia.    Left posterior neck infection, abscess. Improving on antibiotics and abscess cavity care. No need for further abscess packing.     -s/p drainage of left neck abscess at site of previous I&D 12/24  -minimal drainage from abscess cavity today  -okay to DC from ORL perspective  -can transition to oral antibiotics  -abx per primary team  -recommend dressing changes as needed, 4x4 gauze and tape to left neck area of abscess/I&D site. Can change as needed  -can follow up in ORL clinic 7-10 days. Call  to schedule an appointment  -will sign off   -call/page with questions

## 2019-12-27 NOTE — DISCHARGE NOTE PROVIDER - NSDCCPCAREPLAN_GEN_ALL_CORE_FT
PRINCIPAL DISCHARGE DIAGNOSIS  Diagnosis: Abscess  Assessment and Plan of Treatment: You had an abscess in your neck which is an infected fluid pocket.      SECONDARY DISCHARGE DIAGNOSES  Diagnosis: Hyponatremia  Assessment and Plan of Treatment: PRINCIPAL DISCHARGE DIAGNOSIS  Diagnosis: Abscess  Assessment and Plan of Treatment: You had an abscess in your neck which is an infected fluid pocket. We did a procedure to drain this pocket, which cleared the collection. The fluid pocket showed MSSA bacteria, for which we treated with antibiotics in the hospital. You will continue taking your prescribed antibiotics as indicated to finish your treatment course. Please change your wound gauze as needed. If you experience any fevers, chills, headache, difficulty swallowing, or any new symptoms, please come to the hospital.      SECONDARY DISCHARGE DIAGNOSES  Diagnosis: Hyponatremia  Assessment and Plan of Treatment: During your hospitalization, you had a low sodium, which resolved by limiting your water intake. You should follow up with your primary care doctor to recheck your sodium lab test.

## 2019-12-27 NOTE — PROGRESS NOTE ADULT - PROBLEM SELECTOR PLAN 3
- patient hyponatremic to 125 on admission, s/p 500cc bolus with no improvement in sodium level  - low serum osmolality, urine lytes consistent with SIADH  - TSH wnl  - Fluid restriction 800cc

## 2019-12-29 LAB
BACTERIA BLD CULT: SIGNIFICANT CHANGE UP
BACTERIA BLD CULT: SIGNIFICANT CHANGE UP

## 2020-08-31 NOTE — H&P ADULT - PROBLEM SELECTOR PLAN 6
- patient with normocytic anemia with Hgb 12.3 on admission, unknown baseline  - f/u iron studies  - no signs of active bleed at this time  - continue to trend EOMI; PERRL; no drainage or redness

## 2022-06-02 NOTE — ED PROVIDER NOTE - CLINICAL SUMMARY MEDICAL DECISION MAKING FREE TEXT BOX
Deep Sutures: 5-0 PGLC 89yo male with no sig pmh, as per granddaughter, presents with abscess with purulent drainage on the right side of his neck. Oral temp of 99.4, will get rectal temp. Otherwise vitals stable upon arrival. Will get labs and a1c. Will I&D at bedside and give antibiotics. 89yo male with no sig pmh, as per granddaughter, presents with abscess with purulent drainage on the leftside of his neck. Oral temp of 99.4, will get rectal temp. Otherwise vitals stable upon arrival. Will get labs and a1c. Will I&D at bedside and give antibiotics.

## 2022-06-07 NOTE — ED ADULT NURSE NOTE - CHIEF COMPLAINT QUOTE
pt. brought by granddaughter for an abscess on the L side of his neck, redness noted around the abscess, unsure of how long it has been there. Family denies PMHx.
Ambulatory

## 2022-12-08 NOTE — SBIRT NOTE ADULT - NSSBIRTDRINKAM_GEN_A_CORE
I attest my time as attending is greater than 50% of the total combined time spent on qualifying patient care activities by the PA/NP and attending.
Never

## 2023-04-10 NOTE — ED ADULT NURSE NOTE - NS ED NOTE ABUSE RESPONSE YN
SW received a phone call back from Jennifer Jara [address] that they used to be affiliated with [address] the [facility] until March 1, 2023. They have skilled and long term care if there is a need for it. SW spoke to [facility] in admissions and she stated that she was on her way to the office and would review the packet set by Charles Gutierrez, other , over the weekend. This facility is the patient's first preference. We will wait to hear back from this provider. She will need a precert. Respectfully submitted,    Alaina FLOR, LISW-S  [facility]   [phone]    Electronically signed by BASIA Allen, LSALONA on 4/10/2023 at 8:44 AM Yes

## 2024-06-26 NOTE — SBIRT NOTE ADULT - NSSBIRTOFTENALCOHOL_GEN_A_CORE
Addended by: SAVANNAH VALENTIN on: 6/26/2024 02:08 PM     Modules accepted: Orders    
2 or 3 times a week

## 2024-11-18 NOTE — PHYSICAL THERAPY INITIAL EVALUATION ADULT - PATIENT/FAMILY/SIGNIFICANT OTHER GOALS STATEMENT, PT EVAL
Instructions for your procedure at [facility]      Today's Date: 11/18/2024      Patient's Name: Gurpreet Schmidt      Procedure Date: 11/22/2024        Please enter the main entrance of the hospital and check-in at the  located in the lobby.      Do NOT eat or drink anything, including candy, gum, or ice chips after midnight prior to your procedure, unless it is part of your prep.  Brush your teeth before coming to the hospital.You may swish with water, but do not swallow.  No smoking/Vaping/E-Cigarettes 24 hours prior to the day of procedure.  No alcohol 24 hours prior to the day of procedure.  No recreational drugs for one week prior to the day of procedure.  Bring Photo ID, Insurance information, and Co-pay if required on day of procedure.  Bring in pertinent legal documents, such as, Medical Power of , DNR, Advance Directive, etc.  Leave all other valuables, including money/purse, at home.  Remove jewelry, including ALL body piercings, nail polish, acrylic nails, and makeup (including mascara); no lotions, powders, deodorant, and/or perfume/cologne/after shave on the skin.  Glasses and dentures may be worn to the hospital.  They must be removed prior to procedure. Please bring case/container for glasses or dentures.  11. Contacts should not be worn on day of procedure.   12. Call the office ([phone]) if you have symptoms of a cold or illness within 24-48 hours prior to your procedure.   13. AN ADULT (relative or friend 18 years or older) MUST DRIVE YOU HOME AFTER YOUR PROCEDURE.   14. Please make arrangements for a responsible adult (18 years or older) to be with you for 24 hours after your procedure.   15. TWO VISITORS will be allowed in the waiting area during your procedure.       Special Instructions:      Bring list of CURRENT medications.  Follow instructions from the office regarding Bowel Prep, Vitamins, Iron, Blood Thinners, Insulin, Seizure, and 
to go home